# Patient Record
Sex: FEMALE | Race: OTHER | Employment: PART TIME | URBAN - METROPOLITAN AREA
[De-identification: names, ages, dates, MRNs, and addresses within clinical notes are randomized per-mention and may not be internally consistent; named-entity substitution may affect disease eponyms.]

---

## 2017-10-24 ENCOUNTER — LAB ENCOUNTER (OUTPATIENT)
Dept: LAB | Age: 22
End: 2017-10-24
Attending: FAMILY MEDICINE
Payer: COMMERCIAL

## 2017-10-24 DIAGNOSIS — L68.0 HIRSUTISM: ICD-10-CM

## 2017-10-24 DIAGNOSIS — Z00.00 PHYSICAL EXAM, ROUTINE: ICD-10-CM

## 2017-10-24 PROCEDURE — 85025 COMPLETE CBC W/AUTO DIFF WBC: CPT

## 2017-10-24 PROCEDURE — 80053 COMPREHEN METABOLIC PANEL: CPT

## 2017-10-24 PROCEDURE — 84403 ASSAY OF TOTAL TESTOSTERONE: CPT

## 2017-10-24 PROCEDURE — 36415 COLL VENOUS BLD VENIPUNCTURE: CPT

## 2017-10-24 PROCEDURE — 84443 ASSAY THYROID STIM HORMONE: CPT

## 2017-10-24 PROCEDURE — 84146 ASSAY OF PROLACTIN: CPT

## 2017-10-24 PROCEDURE — 83498 ASY HYDROXYPROGESTERONE 17-D: CPT

## 2017-10-24 NOTE — PROGRESS NOTES
HPI:    Patient ID: Megan Coulter is a 23year old female. Has anxiety. Working full time. Review of Systems   Constitutional: Negative. Negative for activity change, appetite change, chills and diaphoresis. HENT: Negative.     Respiratory: Neg tenderness or deformity. Lymphadenopathy:        Right: No inguinal adenopathy present. Left: No inguinal adenopathy present. Neurological: She is alert and oriented to person, place, and time. She has normal reflexes.    Skin: Skin is warm and d

## 2017-10-26 ENCOUNTER — TELEPHONE (OUTPATIENT)
Dept: OTHER | Age: 22
End: 2017-10-26

## 2018-11-03 ENCOUNTER — HOSPITAL ENCOUNTER (OUTPATIENT)
Dept: MAMMOGRAPHY | Age: 23
Discharge: HOME | End: 2018-11-03
Attending: SPECIALIST
Payer: COMMERCIAL

## 2018-11-03 DIAGNOSIS — N64.4 PAIN OF RIGHT BREAST: ICD-10-CM

## 2019-11-27 ENCOUNTER — OFFICE VISIT (OUTPATIENT)
Dept: FAMILY MEDICINE CLINIC | Facility: CLINIC | Age: 24
End: 2019-11-27
Payer: COMMERCIAL

## 2019-11-27 VITALS
TEMPERATURE: 98 F | WEIGHT: 147 LBS | BODY MASS INDEX: 24.49 KG/M2 | DIASTOLIC BLOOD PRESSURE: 51 MMHG | SYSTOLIC BLOOD PRESSURE: 86 MMHG | HEART RATE: 59 BPM | HEIGHT: 65 IN

## 2019-11-27 DIAGNOSIS — Z02.0 SCHOOL PHYSICAL EXAM: Primary | ICD-10-CM

## 2019-11-27 PROCEDURE — 99395 PREV VISIT EST AGE 18-39: CPT | Performed by: FAMILY MEDICINE

## 2019-11-27 PROCEDURE — 90715 TDAP VACCINE 7 YRS/> IM: CPT | Performed by: FAMILY MEDICINE

## 2019-11-27 PROCEDURE — 90471 IMMUNIZATION ADMIN: CPT | Performed by: FAMILY MEDICINE

## 2019-11-27 NOTE — PROGRESS NOTES
HPI:    Patient ID: Faisal Mayo is a 30year old female. Here for school physical. No complaints . Doing well      Review of Systems   Constitutional: Negative. Negative for activity change, diaphoresis and fever. HENT: Negative.     Respiratory: Neg

## 2020-03-19 ENCOUNTER — TELEPHONE (OUTPATIENT)
Dept: FAMILY MEDICINE CLINIC | Facility: CLINIC | Age: 25
End: 2020-03-19

## 2020-03-19 DIAGNOSIS — B34.9 VIRAL SYNDROME: Primary | ICD-10-CM

## 2020-03-19 RX ORDER — BENZONATATE 200 MG/1
200 CAPSULE ORAL 3 TIMES DAILY PRN
Qty: 30 CAPSULE | Refills: 0 | Status: SHIPPED | OUTPATIENT
Start: 2020-03-19 | End: 2020-06-30 | Stop reason: ALTCHOICE

## 2020-03-20 ENCOUNTER — APPOINTMENT (OUTPATIENT)
Dept: LAB | Facility: HOSPITAL | Age: 25
End: 2020-03-20
Attending: FAMILY MEDICINE
Payer: COMMERCIAL

## 2020-03-20 DIAGNOSIS — B34.9 VIRAL SYNDROME: ICD-10-CM

## 2020-03-20 PROCEDURE — 87798 DETECT AGENT NOS DNA AMP: CPT

## 2020-03-20 PROCEDURE — 87633 RESP VIRUS 12-25 TARGETS: CPT

## 2020-03-20 PROCEDURE — 87581 M.PNEUMON DNA AMP PROBE: CPT

## 2020-03-20 PROCEDURE — 87486 CHLMYD PNEUM DNA AMP PROBE: CPT

## 2020-03-20 NOTE — TELEPHONE ENCOUNTER
I ordered covid testing .  Patient was contacted
Left message that I will call in cough drops.  If not better needs to call back
PT called in and states she has a dry cough and would like medication for it.  Please advise
Patient states she awaiting call from Dr. Marry Ingram as she is worried.
The patient called back and stated was to called back . She would like to be tested for the Covid 19    She denies a fever, chest congestion/pain     She has a sore throat, tickle behind her throat, headache, fatigue, body aches, runny nose,     She did travel to [facility] 1 month ago and was around sick children.     She received a letter stating that where she work and they work very closely together that a personal had a positive Covid 19 test.
Virtual/Telephone Check-In    [employer] verbally  a Virtual/Telephone Check-In service on 03/19/20. Patient understands and accepts financial responsibility for any deductible, co-insurance and/or co-pays associated with this service.     Duration of the service: ***    Summary of topics discussed:  ***
No

## 2020-03-21 LAB

## 2020-03-24 ENCOUNTER — TELEPHONE (OUTPATIENT)
Dept: FAMILY MEDICINE CLINIC | Facility: CLINIC | Age: 25
End: 2020-03-24

## 2020-03-24 NOTE — TELEPHONE ENCOUNTER
Spoke with patient and informed of positive rhinovirus result. Supportive care discussed. Follow up with pcp as needed. Patient verbalized understanding.

## 2020-06-30 ENCOUNTER — HOSPITAL ENCOUNTER (OUTPATIENT)
Dept: GENERAL RADIOLOGY | Facility: HOSPITAL | Age: 25
Discharge: HOME | End: 2020-06-30
Attending: FAMILY MEDICINE
Payer: MEDICAID

## 2020-06-30 ENCOUNTER — TELEPHONE (OUTPATIENT)
Dept: FAMILY MEDICINE CLINIC | Facility: CLINIC | Age: 25
End: 2020-06-30

## 2020-06-30 ENCOUNTER — OFFICE VISIT (OUTPATIENT)
Dept: FAMILY MEDICINE CLINIC | Facility: CLINIC | Age: 25
End: 2020-06-30
Payer: MEDICAID

## 2020-06-30 VITALS
BODY MASS INDEX: 23.46 KG/M2 | TEMPERATURE: 100 F | WEIGHT: 140.81 LBS | HEIGHT: 65 IN | HEART RATE: 78 BPM | DIASTOLIC BLOOD PRESSURE: 76 MMHG | SYSTOLIC BLOOD PRESSURE: 118 MMHG

## 2020-06-30 DIAGNOSIS — M79.604 PAIN OF RIGHT LOWER EXTREMITY: ICD-10-CM

## 2020-06-30 DIAGNOSIS — H53.9 VISION DISORDER: ICD-10-CM

## 2020-06-30 DIAGNOSIS — M25.561 RIGHT KNEE PAIN, UNSPECIFIED CHRONICITY: ICD-10-CM

## 2020-06-30 DIAGNOSIS — M79.671 FOOT PAIN, BILATERAL: ICD-10-CM

## 2020-06-30 DIAGNOSIS — M79.604 LOWER EXTREMITY PAIN, RIGHT: Primary | ICD-10-CM

## 2020-06-30 DIAGNOSIS — M79.672 FOOT PAIN, BILATERAL: ICD-10-CM

## 2020-06-30 DIAGNOSIS — M25.561 RIGHT KNEE PAIN, UNSPECIFIED CHRONICITY: Primary | ICD-10-CM

## 2020-06-30 PROCEDURE — 99213 OFFICE O/P EST LOW 20 MIN: CPT | Performed by: FAMILY MEDICINE

## 2020-06-30 PROCEDURE — 73590 X-RAY EXAM OF LOWER LEG: CPT | Performed by: FAMILY MEDICINE

## 2020-06-30 PROCEDURE — 73564 X-RAY EXAM KNEE 4 OR MORE: CPT | Performed by: FAMILY MEDICINE

## 2020-06-30 RX ORDER — IBUPROFEN 600 MG/1
600 TABLET ORAL EVERY 6 HOURS PRN
COMMUNITY
End: 2021-03-31

## 2020-06-30 NOTE — TELEPHONE ENCOUNTER
Scheduled to see Dr Vivian Real 6/30/20 2:15 pm OV. Advised to wear a mask, at-door screening. TS done. Patient stated right knee has flared, possibly due to exercise, it is swollen, no redness and no increased warmth to the right knee today.  The right knee

## 2020-06-30 NOTE — TELEPHONE ENCOUNTER
Dr. Won Alcala need new order patient trying to schedule need knee xray should be right not left for right lower extremity pain.    Pended for your approval

## 2020-07-01 NOTE — PROGRESS NOTES
HPI:    Patient ID: Ai Smith is a 30year old female. Has right knee pain and mild swelling . Doing boot camp. Also history of some ?fractire tibia? Review of Systems   Constitutional: Negative. Respiratory: Negative.     Cardiovascular: Nega

## 2020-07-11 ENCOUNTER — OFFICE VISIT (OUTPATIENT)
Dept: FAMILY MEDICINE CLINIC | Facility: CLINIC | Age: 25
End: 2020-07-11
Payer: MEDICAID

## 2020-07-11 VITALS
BODY MASS INDEX: 23.32 KG/M2 | DIASTOLIC BLOOD PRESSURE: 72 MMHG | HEIGHT: 65 IN | HEART RATE: 65 BPM | WEIGHT: 140 LBS | SYSTOLIC BLOOD PRESSURE: 108 MMHG | TEMPERATURE: 99 F

## 2020-07-11 DIAGNOSIS — M25.561 ARTHRALGIA OF RIGHT LOWER LEG: ICD-10-CM

## 2020-07-11 DIAGNOSIS — H91.93 HEARING DIFFICULTY OF BOTH EARS: Primary | ICD-10-CM

## 2020-07-11 PROCEDURE — 3074F SYST BP LT 130 MM HG: CPT | Performed by: FAMILY MEDICINE

## 2020-07-11 PROCEDURE — 3078F DIAST BP <80 MM HG: CPT | Performed by: FAMILY MEDICINE

## 2020-07-11 PROCEDURE — 3008F BODY MASS INDEX DOCD: CPT | Performed by: FAMILY MEDICINE

## 2020-07-11 PROCEDURE — 99214 OFFICE O/P EST MOD 30 MIN: CPT | Performed by: FAMILY MEDICINE

## 2020-07-11 RX ORDER — CEFADROXIL 500 MG/1
500 CAPSULE ORAL 2 TIMES DAILY
Qty: 14 CAPSULE | Refills: 0 | Status: SHIPPED | OUTPATIENT
Start: 2020-07-11 | End: 2020-07-22 | Stop reason: ALTCHOICE

## 2020-07-17 ENCOUNTER — OFFICE VISIT (OUTPATIENT)
Dept: OTOLARYNGOLOGY | Facility: CLINIC | Age: 25
End: 2020-07-17
Payer: MEDICAID

## 2020-07-17 ENCOUNTER — OFFICE VISIT (OUTPATIENT)
Dept: AUDIOLOGY | Facility: CLINIC | Age: 25
End: 2020-07-17

## 2020-07-17 VITALS — HEIGHT: 65 IN | WEIGHT: 140 LBS | BODY MASS INDEX: 23.32 KG/M2

## 2020-07-17 DIAGNOSIS — H91.93 BILATERAL HEARING LOSS, UNSPECIFIED HEARING LOSS TYPE: Primary | ICD-10-CM

## 2020-07-17 DIAGNOSIS — H91.8X9 OTHER SPECIFIED HEARING LOSS, UNSPECIFIED EAR: Primary | ICD-10-CM

## 2020-07-17 PROCEDURE — 3008F BODY MASS INDEX DOCD: CPT | Performed by: OTOLARYNGOLOGY

## 2020-07-17 PROCEDURE — 92557 COMPREHENSIVE HEARING TEST: CPT | Performed by: AUDIOLOGIST

## 2020-07-17 PROCEDURE — 99203 OFFICE O/P NEW LOW 30 MIN: CPT | Performed by: OTOLARYNGOLOGY

## 2020-07-17 PROCEDURE — 92567 TYMPANOMETRY: CPT | Performed by: AUDIOLOGIST

## 2020-07-17 NOTE — PROGRESS NOTES
Giuliana Mosley is a 30year old female. Patient presents with:  Ear Problem: pt presents with Hearing loss, ringing in both ears         HISTORY OF PRESENT ILLNESS  7/17/2020   Here for evaluation of  Bilateral  hearing loss.  Patient feels that she had this on file        Physically abused: Not on file        Forced sexual activity: Not on file    Other Topics      Concerns:         Service: Not Asked        Blood Transfusions: Not Asked        Caffeine Concern: Yes          Coffee, 3 cups daily kg/m²     System Findings Details   Skin Normal Inspection - Normal. No suspicious lesions bruises or masses.    Constitutional Normal Overall appearance - Normal.   Head/Face Normal Facial features - Normal. Eyebrows - Normal. Skull - Normal.   Nasopharynx

## 2020-07-21 NOTE — PROGRESS NOTES
AUDIOLOGY REPORT      Fabiana Ohara is a 30year old female     Referring Provider: Bobby Galindo   YOB: 1995  Medical Record: [id number]      Patient Hearing History:  Patient reported a question of hearing loss.        Otoscopic Inspection:

## 2020-07-22 ENCOUNTER — OFFICE VISIT (OUTPATIENT)
Dept: FAMILY MEDICINE CLINIC | Facility: CLINIC | Age: 25
End: 2020-07-22
Payer: MEDICAID

## 2020-07-22 ENCOUNTER — MED REC SCAN ONLY (OUTPATIENT)
Dept: FAMILY MEDICINE CLINIC | Facility: CLINIC | Age: 25
End: 2020-07-22

## 2020-07-22 VITALS
HEIGHT: 65 IN | DIASTOLIC BLOOD PRESSURE: 56 MMHG | SYSTOLIC BLOOD PRESSURE: 91 MMHG | HEART RATE: 48 BPM | BODY MASS INDEX: 23.82 KG/M2 | WEIGHT: 143 LBS | TEMPERATURE: 99 F

## 2020-07-22 DIAGNOSIS — M25.561 CHRONIC PAIN OF RIGHT KNEE: Primary | ICD-10-CM

## 2020-07-22 DIAGNOSIS — G89.29 CHRONIC PAIN OF RIGHT KNEE: Primary | ICD-10-CM

## 2020-07-22 PROCEDURE — 3074F SYST BP LT 130 MM HG: CPT | Performed by: FAMILY MEDICINE

## 2020-07-22 PROCEDURE — 99213 OFFICE O/P EST LOW 20 MIN: CPT | Performed by: FAMILY MEDICINE

## 2020-07-22 PROCEDURE — 3008F BODY MASS INDEX DOCD: CPT | Performed by: FAMILY MEDICINE

## 2020-07-22 PROCEDURE — 97810 ACUP 1/> WO ESTIM 1ST 15 MIN: CPT | Performed by: FAMILY MEDICINE

## 2020-07-22 PROCEDURE — 3078F DIAST BP <80 MM HG: CPT | Performed by: FAMILY MEDICINE

## 2020-07-22 NOTE — PROGRESS NOTES
HPI:    Patient ID: Triny Farrell is a 30year old female. Patient complaining about the pain in the inner aspect of the lnee the same spot where she had pain where had subchondral fracture opf the tibia.  Patient is a runner and this has been limiting he

## 2020-07-22 NOTE — PROCEDURES
Patient tolerated procedure well in excess of 30 minutes was spent with patient   There was no complications all needles were removed.    Patient was advised to rest today and f/u in 1-2 weeks  tim treatment and ear points  Local points with high frequency

## 2020-07-29 ENCOUNTER — OFFICE VISIT (OUTPATIENT)
Dept: NEUROLOGY | Facility: CLINIC | Age: 25
End: 2020-07-29
Payer: MEDICAID

## 2020-07-29 DIAGNOSIS — S86.891A RIGHT MEDIAL TIBIAL STRESS SYNDROME, INITIAL ENCOUNTER: ICD-10-CM

## 2020-07-29 DIAGNOSIS — N92.6 IRREGULAR MENSTRUAL CYCLE: ICD-10-CM

## 2020-07-29 DIAGNOSIS — M25.561 CHRONIC PAIN OF RIGHT KNEE: Primary | ICD-10-CM

## 2020-07-29 DIAGNOSIS — G89.29 CHRONIC PAIN OF RIGHT KNEE: Primary | ICD-10-CM

## 2020-07-29 DIAGNOSIS — F41.1 ANXIETY STATE: ICD-10-CM

## 2020-07-29 PROCEDURE — 99245 OFF/OP CONSLTJ NEW/EST HI 55: CPT | Performed by: PHYSICAL MEDICINE & REHABILITATION

## 2020-07-29 NOTE — PROGRESS NOTES
[address]  Progress Note    CHIEF COMPLAINT:  Patient presents with:  Knee Pain: New pt presents for Right knee pain, previous tibial hairline fx.  Pt is a  as a side job Right inside of knee, tender • Diabetes Other         History of Diabetes   • Hypertension Other         History of Hypertension   • Glaucoma Neg         No glaucoma history       CURRENT MEDICATIONS:   Current Outpatient Medications   Medication Sig Dispense Refill   • triamcinolon extremity edema bilaterally, tender to palpation over the medial shin over the distal third  Skin: No lesions noted. Hips: full and painfree ROM   Knees: Tender over the medial joint line on the right. Good varus valgus stability.   Lachman's is negative Veto Gutierres

## 2020-07-30 NOTE — PROGRESS NOTES
HPI:    Patient ID: Faisal Mayo is a 30year old female. Has folliculitis in the groin area   Also complaining about bilateral hearing loss  Also has ankle pain       Review of Systems  Constitutional: Negative.   Negative for activity change, appetite

## 2020-07-31 ENCOUNTER — OFFICE VISIT (OUTPATIENT)
Dept: ORTHOPEDICS CLINIC | Facility: CLINIC | Age: 25
End: 2020-07-31
Payer: MEDICAID

## 2020-07-31 VITALS — HEIGHT: 65 IN | WEIGHT: 143 LBS | BODY MASS INDEX: 23.82 KG/M2

## 2020-07-31 DIAGNOSIS — M23.91 INTERNAL DERANGEMENT OF RIGHT KNEE: Primary | ICD-10-CM

## 2020-07-31 PROCEDURE — 3008F BODY MASS INDEX DOCD: CPT | Performed by: ORTHOPAEDIC SURGERY

## 2020-07-31 PROCEDURE — 99203 OFFICE O/P NEW LOW 30 MIN: CPT | Performed by: ORTHOPAEDIC SURGERY

## 2020-07-31 RX ORDER — TERBINAFINE HYDROCHLORIDE 250 MG/1
250 TABLET ORAL DAILY
COMMUNITY

## 2020-07-31 RX ORDER — MELOXICAM 15 MG/1
15 TABLET ORAL DAILY
Qty: 30 TABLET | Refills: 0 | Status: SHIPPED | OUTPATIENT
Start: 2020-07-31

## 2020-07-31 NOTE — PROGRESS NOTES
NURSING INTAKE COMMENTS: Patient presents with:  Knee Pain: Right - onset several years ago with a fx tibia - this year got worse - has swelling , throbbing pain , knee locks up  - has x-rays in the system - rates pain as 5-9/10 at all the time depending o Cannabis      Sexual activity: Yes        Partners: Male       Review of Systems:  GENERAL: feels generally well, no significant weight loss or weight gain  SKIN: no ulcerated or worrisome skin lesions  EYES:denies blurred vision or double vision  HEENT: d anserine bursitis and a tear of the medial meniscus. A stress fracture of the medial tibial plateau is also on the differential diagnosis. Plan: She is scheduled to follow-up with physiatry after her MRI has been completed.   I gave her prescription for

## 2020-08-04 ENCOUNTER — OFFICE VISIT (OUTPATIENT)
Dept: OBGYN CLINIC | Facility: CLINIC | Age: 25
End: 2020-08-04
Payer: MEDICAID

## 2020-08-04 ENCOUNTER — TELEMEDICINE (OUTPATIENT)
Dept: FAMILY MEDICINE CLINIC | Facility: CLINIC | Age: 25
End: 2020-08-04

## 2020-08-04 ENCOUNTER — LAB ENCOUNTER (OUTPATIENT)
Dept: LAB | Facility: HOSPITAL | Age: 25
End: 2020-08-04
Attending: OBSTETRICS & GYNECOLOGY
Payer: MEDICAID

## 2020-08-04 VITALS
WEIGHT: 143 LBS | BODY MASS INDEX: 23.82 KG/M2 | SYSTOLIC BLOOD PRESSURE: 112 MMHG | HEIGHT: 65 IN | DIASTOLIC BLOOD PRESSURE: 62 MMHG

## 2020-08-04 DIAGNOSIS — Z11.3 SCREEN FOR STD (SEXUALLY TRANSMITTED DISEASE): ICD-10-CM

## 2020-08-04 DIAGNOSIS — Z01.419 WELL WOMAN EXAM WITH ROUTINE GYNECOLOGICAL EXAM: Primary | ICD-10-CM

## 2020-08-04 DIAGNOSIS — R10.9 ABDOMINAL PAIN, UNSPECIFIED ABDOMINAL LOCATION: Primary | ICD-10-CM

## 2020-08-04 LAB
HBV SURFACE AG SER-ACNC: <0.1 [IU]/L
HBV SURFACE AG SERPL QL IA: NONREACTIVE
HCV AB SERPL QL IA: NONREACTIVE

## 2020-08-04 PROCEDURE — 87389 HIV-1 AG W/HIV-1&-2 AB AG IA: CPT

## 2020-08-04 PROCEDURE — 87340 HEPATITIS B SURFACE AG IA: CPT

## 2020-08-04 PROCEDURE — 3078F DIAST BP <80 MM HG: CPT | Performed by: OBSTETRICS & GYNECOLOGY

## 2020-08-04 PROCEDURE — 36415 COLL VENOUS BLD VENIPUNCTURE: CPT

## 2020-08-04 PROCEDURE — 3008F BODY MASS INDEX DOCD: CPT | Performed by: OBSTETRICS & GYNECOLOGY

## 2020-08-04 PROCEDURE — 86803 HEPATITIS C AB TEST: CPT

## 2020-08-04 PROCEDURE — 86780 TREPONEMA PALLIDUM: CPT

## 2020-08-04 PROCEDURE — 99385 PREV VISIT NEW AGE 18-39: CPT | Performed by: OBSTETRICS & GYNECOLOGY

## 2020-08-04 PROCEDURE — 99213 OFFICE O/P EST LOW 20 MIN: CPT | Performed by: FAMILY MEDICINE

## 2020-08-04 PROCEDURE — 3074F SYST BP LT 130 MM HG: CPT | Performed by: OBSTETRICS & GYNECOLOGY

## 2020-08-04 PROCEDURE — 99212 OFFICE O/P EST SF 10 MIN: CPT | Performed by: OBSTETRICS & GYNECOLOGY

## 2020-08-04 NOTE — PROGRESS NOTES
HPI:    Patient ID: Warner Bang is a 30year old year old female. HPI  New patient  Well woman visit with a number of GYN problems  Complains of spotting since Cornelious Chute IUD placed in November. .  Is very light.   States that menses were irregular prior to Physical Exam    Vitals: /62   Ht 5' 5\" (1.651 m)   Wt 143 lb (64.9 kg)   LMP 07/15/2020 (Exact Date)   BMI 23.80 kg/m²   Neck: Supple and without masses. No cervical adenopathy. Breasts: Symmetric. Skin without lesions. No masses.   Miroslava Schwarz disease)  Plan: requested and ordered. IUD check up. Scant periods due to [facility].     10 min additional E/M time face to face  Orders Placed This Encounter      Hpv Dna  High Risk , Thin Prep Collect          Standing Status: Future          Standing E

## 2020-08-05 ENCOUNTER — TELEPHONE (OUTPATIENT)
Dept: OBGYN CLINIC | Facility: CLINIC | Age: 25
End: 2020-08-05

## 2020-08-05 DIAGNOSIS — L81.9 PIGMENTATION ABNORMALITY OF SKIN: Primary | ICD-10-CM

## 2020-08-05 LAB
C TRACH DNA SPEC QL NAA+PROBE: NEGATIVE
HPV I/H RISK 1 DNA SPEC QL NAA+PROBE: NEGATIVE
N GONORRHOEA DNA SPEC QL NAA+PROBE: NEGATIVE
T PALLIDUM AB SER QL: NEGATIVE

## 2020-08-05 NOTE — TELEPHONE ENCOUNTER
----- Message from Aimir Smith sent at 8/4/2020  5:58 PM CDT -----  Regarding: Referral Request  Contact: [phone]  Armani,     Thank you for your time today.      I did not receive the referral for the dermatologist.     May you please send me the refe

## 2020-08-05 NOTE — TELEPHONE ENCOUNTER
I wrote on her sheet yesterday the name of dermatologist Dr. Diego Zamora. Patient can be given her phone number.

## 2020-08-05 NOTE — TELEPHONE ENCOUNTER
Alexia message sent to pt.  ----- Message from Jennie Perez MD sent at 8/5/2020  1:09 PM CDT -----  Please inform by whatever means that her blood screening for sexually transmitted infections including HIV, hepatitis B and C, and syphilis are negativ

## 2020-08-06 ENCOUNTER — TELEPHONE (OUTPATIENT)
Dept: NEUROLOGY | Facility: CLINIC | Age: 25
End: 2020-08-06

## 2020-08-06 ENCOUNTER — TELEPHONE (OUTPATIENT)
Dept: OBGYN CLINIC | Facility: CLINIC | Age: 25
End: 2020-08-06

## 2020-08-06 NOTE — PROGRESS NOTES
HPI:    Patient ID: Cherise Chopra is a 30year old female.     Telehealth outside of [facility] [address] Verbal Consent   I conducted a telehealth visit with Cherise Chopra today, 08/05/20, which was completed using two-way, real-time interactive audio and vid  Negative for activity change, appetite change, diaphoresis and fatigue. Respiratory: Negative.  Negative for apnea, cough, chest tightness and shortness of breath.    Cardiovascular: Negative.  Negative for chest pain, palpitations and leg swelling.

## 2020-08-06 NOTE — TELEPHONE ENCOUNTER
[employer] message sent to pt.      ----- Message from Lorna Nair MD sent at 8/6/2020  3:14 PM CDT -----  Please notify by MyChart or letter of normal Pap test and normal HPV cotesting.

## 2020-08-06 NOTE — TELEPHONE ENCOUNTER
MRI KNEE, RIGHT (CPT=73721)-pending approval    *Referral above did not populate in the [facility] Referral Queue.      Michael Gill for authorization of approval for MRI KNEE, RIGHT (CYY=13345) Spoke to Evita Olvera who states authorization is requi

## 2020-08-07 ENCOUNTER — MED REC SCAN ONLY (OUTPATIENT)
Dept: NEUROLOGY | Facility: CLINIC | Age: 25
End: 2020-08-07

## 2020-08-08 ENCOUNTER — TELEPHONE (OUTPATIENT)
Dept: OBGYN CLINIC | Facility: CLINIC | Age: 25
End: 2020-08-08

## 2020-08-08 NOTE — TELEPHONE ENCOUNTER
----- Message from Renée Fraser sent at 8/7/2020  5:27 PM CDT -----  Regarding: RE: Referral Request  Contact: [phone]  I need a physical copy!     ----- Message -----  From: Quan Martinez  Sent: 8/5/20, 1:49 PM  To: Renée Fraser  Subject: RE: Referral R

## 2020-08-10 NOTE — TELEPHONE ENCOUNTER
Received fax from [address] [place] [address] of approval for MRI right knee. . Authorization # [id number] effective 08/10/20 to 02/06/21. Will call Pt. to inform. Pt. informed of approval. Transferred call to scheduling for appt.

## 2020-08-16 ENCOUNTER — HOSPITAL ENCOUNTER (OUTPATIENT)
Dept: MRI IMAGING | Age: 25
Discharge: HOME | End: 2020-08-16
Attending: PHYSICAL MEDICINE & REHABILITATION
Payer: MEDICAID

## 2020-08-16 DIAGNOSIS — G89.29 CHRONIC PAIN OF RIGHT KNEE: ICD-10-CM

## 2020-08-16 DIAGNOSIS — M25.561 CHRONIC PAIN OF RIGHT KNEE: ICD-10-CM

## 2020-08-16 PROCEDURE — 73721 MRI JNT OF LWR EXTRE W/O DYE: CPT | Performed by: PHYSICAL MEDICINE & REHABILITATION

## 2020-08-17 ENCOUNTER — TELEPHONE (OUTPATIENT)
Dept: NEUROLOGY | Facility: CLINIC | Age: 25
End: 2020-08-17

## 2020-08-17 DIAGNOSIS — M22.2X1 PATELLOFEMORAL PAIN SYNDROME OF RIGHT KNEE: Primary | ICD-10-CM

## 2020-08-17 DIAGNOSIS — S86.891A RIGHT MEDIAL TIBIAL STRESS SYNDROME, INITIAL ENCOUNTER: ICD-10-CM

## 2020-08-17 NOTE — TELEPHONE ENCOUNTER
----- Message from Jo Ann Pugh MD sent at 8/17/2020  3:50 PM CDT -----  Her knee MRI shows intact menisci and no evidence for fracture. There is a medial plica. Please let the patient know that her knee MRI looks good.   No meniscus injury, no fra

## 2020-08-18 ENCOUNTER — TELEPHONE (OUTPATIENT)
Dept: FAMILY MEDICINE CLINIC | Facility: CLINIC | Age: 25
End: 2020-08-18

## 2020-08-18 NOTE — TELEPHONE ENCOUNTER
Patient states she lost her insurance card, has an appointment for a test today. patient is requesting a copy of her insurance card uploaded to her [employer].

## 2020-08-26 ENCOUNTER — OFFICE VISIT (OUTPATIENT)
Dept: PHYSICAL THERAPY | Age: 25
End: 2020-08-26
Attending: PHYSICAL MEDICINE & REHABILITATION
Payer: MEDICAID

## 2020-08-26 DIAGNOSIS — M22.2X1 PATELLOFEMORAL PAIN SYNDROME OF RIGHT KNEE: ICD-10-CM

## 2020-08-26 DIAGNOSIS — S86.891A RIGHT MEDIAL TIBIAL STRESS SYNDROME, INITIAL ENCOUNTER: ICD-10-CM

## 2020-08-26 PROCEDURE — 97161 PT EVAL LOW COMPLEX 20 MIN: CPT

## 2020-08-26 PROCEDURE — 97110 THERAPEUTIC EXERCISES: CPT

## 2020-08-26 NOTE — PROGRESS NOTES
LOWER EXTREMITY EVALUATION:   Referring Physician: Dr. Oliver Clay  Dx:   Patellofemoral pain syndrome of right knee (M22.2X1)  Right medial tibial stress syndrome, initial encounter (P43.555P) Date of Service: 8/26/2020     PATIENT SUMMARY   Ellie Mcclain is a limitations include: running, prolonged walking >1mile, squatting, lunging    Yany describes prior level of function: prior to May, she was able to run and exercise with some pain, but was able to push through     Pt goals include: strengthen her ankle and B  Sensation: intact to light touch    AROM:  Knee: WNL B all motions  Ankle: WNL with exception of decreased L ankle DF  Hip: WNL B all motions      Flexibility:   Hip Flexor: WNL  Hamstrings: WNL  Piriformis: Minimal restriction  Quads:  WNL  Gastroc: WNL min     Based on clinical rationale and outcome measures, this evaluation involved Low Complexity decision making. PLAN OF CARE:    Goals:    1.  Pt will verbalize and demo compliance with HEP at least 75% of the time to allow for independent manag

## 2020-09-02 ENCOUNTER — OFFICE VISIT (OUTPATIENT)
Dept: PHYSICAL THERAPY | Age: 25
End: 2020-09-02
Attending: PHYSICAL MEDICINE & REHABILITATION
Payer: MEDICAID

## 2020-09-02 PROCEDURE — 97110 THERAPEUTIC EXERCISES: CPT

## 2020-09-02 PROCEDURE — 97530 THERAPEUTIC ACTIVITIES: CPT

## 2020-09-02 NOTE — PROGRESS NOTES
Diagnosis: Patellofemoral pain syndrome of right knee (M22.2X1)  Right medial tibial stress syndrome, initial encounter (M72.486M)  Insurance (Authorized # of Visits):  Jalen Alonso (6 visits exp 2/24/21)      Authorizing Physician: Dr. Ketan Boyer MD visit: none range 5-6 oscillations rep 3-5x alternating sides    Sidestepping with BTB 1 lap then stopped secondary to lumbar compensation    Standing glut med hip abd with BTB 2x10    Wall squat with BlackTB around knees with hip abd 2x10 R/L    Self roll out with hy

## 2020-09-08 ENCOUNTER — LAB ENCOUNTER (OUTPATIENT)
Dept: LAB | Facility: HOSPITAL | Age: 25
End: 2020-09-08
Payer: MEDICAID

## 2020-09-08 DIAGNOSIS — B35.1 TINEA UNGUIUM: Primary | ICD-10-CM

## 2020-09-08 LAB
ALBUMIN SERPL-MCNC: 4.2 G/DL (ref 3.4–5)
ALP LIVER SERPL-CCNC: 92 U/L (ref 37–98)
ALT SERPL-CCNC: 27 U/L (ref 13–56)
AST SERPL-CCNC: 25 U/L (ref 15–37)
BILIRUB DIRECT SERPL-MCNC: 0.1 MG/DL (ref 0–0.2)
BILIRUB SERPL-MCNC: 0.3 MG/DL (ref 0.1–2)
M PROTEIN MFR SERPL ELPH: 7.4 G/DL (ref 6.4–8.2)

## 2020-09-08 PROCEDURE — 80076 HEPATIC FUNCTION PANEL: CPT

## 2020-09-08 PROCEDURE — 36415 COLL VENOUS BLD VENIPUNCTURE: CPT

## 2020-09-09 ENCOUNTER — APPOINTMENT (OUTPATIENT)
Dept: PHYSICAL THERAPY | Age: 25
End: 2020-09-09
Attending: PHYSICAL MEDICINE & REHABILITATION
Payer: MEDICAID

## 2020-09-16 ENCOUNTER — APPOINTMENT (OUTPATIENT)
Dept: PHYSICAL THERAPY | Age: 25
End: 2020-09-16
Attending: PHYSICAL MEDICINE & REHABILITATION
Payer: MEDICAID

## 2020-09-18 ENCOUNTER — OFFICE VISIT (OUTPATIENT)
Dept: PHYSICAL THERAPY | Age: 25
End: 2020-09-18
Attending: PHYSICAL MEDICINE & REHABILITATION
Payer: MEDICAID

## 2020-09-18 PROCEDURE — 97112 NEUROMUSCULAR REEDUCATION: CPT

## 2020-09-18 PROCEDURE — 97110 THERAPEUTIC EXERCISES: CPT

## 2020-09-18 NOTE — PROGRESS NOTES
Diagnosis: Patellofemoral pain syndrome of right knee (M22.2X1)  Right medial tibial stress syndrome, initial encounter (S07.732P)  Insurance (Authorized # of Visits):  Jalen Alonso (6 visits exp 2/24/21)      Authorizing Physician: Dr. Lonnie Baires  Next MD visit: none roller at lumbar paraspinals, HS, ITB, adductors on R SLS clocks lat and fwd 2x10 R/L with mirror cue    Squatting with weight shift laterally    Squatting with weight shift laterally and light UE support for improved sitback    Squatting with BlackTB arou progress  5. Pt will be able to ambulate 1mile or more without pain - in progress    Charges:  TherEx x3, Neuro Re-ed x1    Total Timed Treatment: 55 min  Total Treatment Time: 55 min

## 2020-09-23 ENCOUNTER — TELEPHONE (OUTPATIENT)
Dept: PHYSICAL THERAPY | Age: 25
End: 2020-09-23

## 2020-09-25 ENCOUNTER — APPOINTMENT (OUTPATIENT)
Dept: PHYSICAL THERAPY | Age: 25
End: 2020-09-25
Attending: PHYSICAL MEDICINE & REHABILITATION
Payer: MEDICAID

## 2020-09-29 ENCOUNTER — OFFICE VISIT (OUTPATIENT)
Dept: PHYSICAL THERAPY | Age: 25
End: 2020-09-29
Attending: PHYSICAL MEDICINE & REHABILITATION
Payer: MEDICAID

## 2020-09-29 PROCEDURE — 97110 THERAPEUTIC EXERCISES: CPT

## 2020-09-29 NOTE — PROGRESS NOTES
Diagnosis: Patellofemoral pain syndrome of right knee (M22.2X1)  Right medial tibial stress syndrome, initial encounter (L86.440Q)  Insurance (Authorized # of Visits):  Jalen Alonso (6 visits exp 21)      Authorizing Physician: Dr. Thelbert Apgar Next MD visit: none bridges with opp LE extended 3x10 R/L    Sidestepping BTB 3 laps [id number] ea dir    Deadbug 3x10    SB planks with roll out fwd then circles 2x10 ea    SB planks with feet on ball alt hip ext 2x10    SL dips off 8\" step 2x10 R/L with UE support as needed running - in progress  5. Pt will be able to ambulate 1mile or more without pain - in progress    Charges:  TherEx x3,  Total Timed Treatment: 45 min  Total Treatment Time: 45 min

## 2020-09-30 ENCOUNTER — OFFICE VISIT (OUTPATIENT)
Dept: DERMATOLOGY CLINIC | Facility: CLINIC | Age: 25
End: 2020-09-30
Payer: MEDICAID

## 2020-09-30 DIAGNOSIS — D23.5 BENIGN NEOPLASM OF SKIN OF TRUNK, EXCEPT SCROTUM: ICD-10-CM

## 2020-09-30 DIAGNOSIS — D23.60 BENIGN NEOPLASM OF SKIN OF UPPER LIMB, INCLUDING SHOULDER, UNSPECIFIED LATERALITY: ICD-10-CM

## 2020-09-30 DIAGNOSIS — L70.0 ACNE VULGARIS: ICD-10-CM

## 2020-09-30 DIAGNOSIS — D23.4 BENIGN NEOPLASM OF SCALP AND SKIN OF NECK: ICD-10-CM

## 2020-09-30 DIAGNOSIS — L81.9 DYSCHROMIA: ICD-10-CM

## 2020-09-30 DIAGNOSIS — D23.30 BENIGN NEOPLASM OF SKIN OF FACE: ICD-10-CM

## 2020-09-30 DIAGNOSIS — D23.70 BENIGN NEOPLASM OF SKIN OF LOWER LIMB, INCLUDING HIP, UNSPECIFIED LATERALITY: ICD-10-CM

## 2020-09-30 DIAGNOSIS — L68.0 HIRSUTISM: Primary | ICD-10-CM

## 2020-09-30 PROCEDURE — 99204 OFFICE O/P NEW MOD 45 MIN: CPT | Performed by: DERMATOLOGY

## 2020-09-30 RX ORDER — ADAPALENE 45 G/G
GEL TOPICAL
Qty: 45 G | Refills: 1 | Status: SHIPPED | OUTPATIENT
Start: 2020-09-30

## 2020-09-30 RX ORDER — HYDROQUINONE 40 MG/G
1 CREAM TOPICAL 2 TIMES DAILY
Qty: 30 G | Refills: 1 | Status: SHIPPED | OUTPATIENT
Start: 2020-09-30 | End: 2020-10-30

## 2020-09-30 RX ORDER — KETOCONAZOLE 20 MG/G
1 CREAM TOPICAL 2 TIMES DAILY
Qty: 30 G | Refills: 3 | Status: SHIPPED | OUTPATIENT
Start: 2020-09-30

## 2020-09-30 RX ORDER — CLOBETASOL PROPIONATE 0.5 MG/G
1 CREAM TOPICAL 2 TIMES DAILY
Qty: 60 G | Refills: 3 | Status: SHIPPED | OUTPATIENT
Start: 2020-09-30 | End: 2021-09-30

## 2020-10-09 ENCOUNTER — APPOINTMENT (OUTPATIENT)
Dept: PHYSICAL THERAPY | Age: 25
End: 2020-10-09
Attending: PHYSICAL MEDICINE & REHABILITATION
Payer: MEDICAID

## 2020-10-11 NOTE — PROGRESS NOTES
Ellie Mcclain is a 30year old female. HPI:     CC:  Patient presents with:  Derm Problem: New patient presents for full body check. Per patient, increased sun exposure this summer and seeing more pigment to face. No personal hx of skin ca.  Maternal aunt w by mouth daily. (Patient not taking: Reported on 8/4/2020 ) 30 tablet 0   • triamcinolone acetonide 0.1 % External Cream Apply topically 2 (two) times daily as needed.  (Patient not taking: Reported on 9/30/2020 ) 60 g 0     Allergies:   No Known Allergies Concerns:         Service: Not Asked        Blood Transfusions: Not Asked        Caffeine Concern: Yes          Coffee, 3 cups daily        Occupational Exposure: Not Asked        Hobby Hazards: Not Asked        Sleep Concern: Not Asked        Stre their usual state of health. History, medications, allergies reviewed as noted. ROS:  Denies any other systemic complaints. 10 point review of systems was completed. Pertinent positives and negatives noted in the the HPI.    No new or changeing lesio of skin of trunk, except scrotum  Benign neoplasm of skin of upper limb, including shoulder, unspecified laterality  Benign neoplasm of skin of lower limb, including hip, unspecified laterality      Acneiform lesions over the forehead fine papules, few nod generally fragrance free nonirritating reviewed. Patient with scattered dermatofibromas few scattered nevi cherry chilel reassurance no suspicious lesions noted. Please refer to map for specific lesions. See additional diagnoses.   Pros cons of various t

## 2020-10-16 ENCOUNTER — OFFICE VISIT (OUTPATIENT)
Dept: PHYSICAL THERAPY | Age: 25
End: 2020-10-16
Attending: PHYSICAL MEDICINE & REHABILITATION
Payer: MEDICAID

## 2020-10-16 PROCEDURE — 97110 THERAPEUTIC EXERCISES: CPT

## 2020-10-16 NOTE — PROGRESS NOTES
Diagnosis: Patellofemoral pain syndrome of right knee (M22.2X1)  Right medial tibial stress syndrome, initial encounter (V10.222C)  Insurance (Authorized # of Visits):  Jalen Alonso (6 visits exp 2/24/21)      Authorizing Physician: Dr. Arlette Singh  Next MD visit: none around knees with alt hip abd to fatigue repeated 5x with mirror cue    Supine SL bridge on heel 10x R/L with focus on glut activation    Long hanging at stall bar with gentle oscillations Supine SB bridge with tuck 3x20    SL bridges with opp LE extended activation, heel to toe and glut activation - handout declined  9/2: quadruped kickbacks 10#, standing glut med hip abd BTB - handout declined  9/18: SL slides fwd and lat, SL bird dips on stable or unstable surface, squatting with alt hip abd - handout is

## 2020-10-20 ENCOUNTER — LAB ENCOUNTER (OUTPATIENT)
Dept: LAB | Age: 25
End: 2020-10-20
Attending: DERMATOLOGY
Payer: MEDICAID

## 2020-10-20 DIAGNOSIS — L68.0 HIRSUTISM: ICD-10-CM

## 2020-10-20 PROCEDURE — 84403 ASSAY OF TOTAL TESTOSTERONE: CPT

## 2020-10-20 PROCEDURE — 84402 ASSAY OF FREE TESTOSTERONE: CPT

## 2020-10-20 PROCEDURE — 36415 COLL VENOUS BLD VENIPUNCTURE: CPT

## 2020-10-20 PROCEDURE — 82627 DEHYDROEPIANDROSTERONE: CPT

## 2020-11-03 ENCOUNTER — APPOINTMENT (OUTPATIENT)
Dept: PHYSICAL THERAPY | Age: 25
End: 2020-11-03
Attending: PHYSICAL MEDICINE & REHABILITATION
Payer: MEDICAID

## 2020-11-03 ENCOUNTER — TELEPHONE (OUTPATIENT)
Dept: PHYSICAL THERAPY | Age: 25
End: 2020-11-03

## 2021-01-01 ENCOUNTER — TELEPHONE (OUTPATIENT)
Dept: FAMILY MEDICINE CLINIC | Facility: CLINIC | Age: 26
End: 2021-01-01

## 2021-01-01 NOTE — TELEPHONE ENCOUNTER
Got page for sore throat and SOB. Phone call made, reports the same complaints, w/out fever, + minimal cough and \" dose not believe is COVID. \" Did not want to go to be tested.  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

## 2021-01-02 ENCOUNTER — TELEMEDICINE (OUTPATIENT)
Dept: TELEHEALTH | Age: 26
End: 2021-01-02

## 2021-01-02 DIAGNOSIS — T78.40XA ALLERGIC REACTION, INITIAL ENCOUNTER: Primary | ICD-10-CM

## 2021-01-02 PROCEDURE — 99202 OFFICE O/P NEW SF 15 MIN: CPT | Performed by: NURSE PRACTITIONER

## 2021-01-02 NOTE — PROGRESS NOTES
Florencio Benito is a 30year old female. HPI:   Patient presents with: Other: nasal congestion, sneezing, headache    Encounter was conducted by video visit. Patient reports one day history of sneezing, nasal congestion and fatigue.  She also reports an occ lymphadenopathy  NEURO: neurological changes      EXAM:   [facility] 07/15/2020 (Exact Date)   GENERAL: well developed, well nourished, in no apparent distress. Patient is alert and oriented but anxious.         ASSESSMENT AND PLAN:     ASSESSMENT:  Allergic react

## 2021-01-06 ENCOUNTER — TELEMEDICINE (OUTPATIENT)
Dept: TELEHEALTH | Age: 26
End: 2021-01-06

## 2021-01-06 DIAGNOSIS — U07.1 COVID-19: Primary | ICD-10-CM

## 2021-01-06 PROCEDURE — 99213 OFFICE O/P EST LOW 20 MIN: CPT | Performed by: NURSE PRACTITIONER

## 2021-01-06 NOTE — PROGRESS NOTES
Marisel Grissom is a 30year old female. HPI:   Patient presents with: Other: Covid questions    Encounter was conducted by video visit. Video visit lasted approximately 25 minutes. Patient states she developed Covid symptoms last week.  Reports diarrhea, week.  MUSCULOSKELETAL: no arthralgia or swollen joints  LYMPH:  Denies lymphadenopathy  NEURO: Denies lightheadedness      EXAM:   [facility]MP 07/15/2020 (Exact Date)   GENERAL: well developed, well nourished, in no apparent distress.  Patient is alert and oriente getting infected or exposed. Wear a facemask  You should wear a facemask when you are around other people (e.g., sharing a room or vehicle) or pets and before you enter a healthcare provider's office.  If you are not able to wear a facemask (for example, b effective use of the cleaning product including precautions you should take when applying the product, such as wearing gloves and making sure you have good ventilation during use of the product.   Monitor your symptoms  Seek prompt medical attention if your them that you have or may have COVID-19. For medical emergencies, call 911 and notify the dispatch personnel that you have or may have COVID-19. Cover your mouth and nose when you cough and sneeze.   Wash your hands often with soap and water for at least

## 2021-01-06 NOTE — PATIENT INSTRUCTIONS
Quarantine Advice: Those who have tested positive for COVID should observe a 10-day quarantine period starting the day your symptoms began.  After your 10 day quarantine, you may return to activity and work if your respiratory symptoms have improved and lined trash can; immediately wash your hands with soap and water for at least 20 seconds or clean your hands with an alcohol-based hand  that contains at least 60%-95% alcohol, covering all surfaces of your hands and rubbing them together until th people in the office or waiting room from getting infected or exposed. Ask your healthcare provider to call the local or state health department.  Persons who are placed under active monitoring or facilitated self-monitoring should follow instructions prov outside of the home, wear a facemask. Avoid sharing personal items with other people in your household, like dishes, towels, and bedding. Clean all surfaces that are touched often, like counters, tabletops, and doorknobs.  Use household cleaning sprays or mayonnaise, sauces, gravies, fried foods, peanut butter, meat, poultry, and fish. Don't eat much fiber. Stay away from raw or cooked vegetables, fresh fruits (except bananas), and bran cereals. Limit how much caffeine and chocolate you have.  Do' use any

## 2021-01-12 ENCOUNTER — TELEMEDICINE (OUTPATIENT)
Dept: FAMILY MEDICINE CLINIC | Facility: CLINIC | Age: 26
End: 2021-01-12

## 2021-01-12 DIAGNOSIS — B34.9 VIRAL SYNDROME: Primary | ICD-10-CM

## 2021-01-12 PROCEDURE — 99213 OFFICE O/P EST LOW 20 MIN: CPT | Performed by: FAMILY MEDICINE

## 2021-01-13 NOTE — PROGRESS NOTES
HPI:    Patient ID: Seun Epstein is a 30year old female.     Telehealth outside of [facility] [address] Verbal Consent   I conducted a telehealth visit with Seun Epstein today, 01/12/21, which was completed using two-way, real-time interactive audio and vid dyspnea no cough. Had some diarrhea. .  Now doing much better  Patient asking is she can get a note that she can travel. 15 min spent with the patient on the phone  This is video visit    Review of Systems  Constitutional: Negative.   Negative for ac

## 2021-01-29 ENCOUNTER — LAB ENCOUNTER (OUTPATIENT)
Dept: LAB | Facility: HOSPITAL | Age: 26
End: 2021-01-29
Attending: FAMILY MEDICINE
Payer: MEDICAID

## 2021-01-29 ENCOUNTER — TELEMEDICINE (OUTPATIENT)
Dept: FAMILY MEDICINE CLINIC | Facility: CLINIC | Age: 26
End: 2021-01-29

## 2021-01-29 DIAGNOSIS — Z20.822 EXPOSURE TO COVID-19 VIRUS: Primary | ICD-10-CM

## 2021-01-29 PROCEDURE — 99213 OFFICE O/P EST LOW 20 MIN: CPT | Performed by: FAMILY MEDICINE

## 2021-01-30 LAB — SARS-COV-2 RNA RESP QL NAA+PROBE: NOT DETECTED

## 2021-03-29 ENCOUNTER — NURSE TRIAGE (OUTPATIENT)
Dept: FAMILY MEDICINE CLINIC | Facility: CLINIC | Age: 26
End: 2021-03-29

## 2021-03-29 ENCOUNTER — PATIENT MESSAGE (OUTPATIENT)
Dept: FAMILY MEDICINE CLINIC | Facility: CLINIC | Age: 26
End: 2021-03-29

## 2021-03-29 ENCOUNTER — HOSPITAL ENCOUNTER (OUTPATIENT)
Age: 26
Discharge: HOME | End: 2021-03-29
Payer: MEDICAID

## 2021-03-29 VITALS
RESPIRATION RATE: 20 BRPM | HEART RATE: 82 BPM | DIASTOLIC BLOOD PRESSURE: 64 MMHG | TEMPERATURE: 99 F | SYSTOLIC BLOOD PRESSURE: 124 MMHG | OXYGEN SATURATION: 100 %

## 2021-03-29 DIAGNOSIS — T78.40XA ALLERGIC REACTION, INITIAL ENCOUNTER: Primary | ICD-10-CM

## 2021-03-29 PROCEDURE — 99213 OFFICE O/P EST LOW 20 MIN: CPT

## 2021-03-29 PROCEDURE — 99203 OFFICE O/P NEW LOW 30 MIN: CPT

## 2021-03-29 RX ORDER — BUPROPION HYDROCHLORIDE 150 MG/1
TABLET ORAL
COMMUNITY
Start: 2021-03-14

## 2021-03-29 RX ORDER — PREDNISONE 20 MG/1
40 TABLET ORAL DAILY
Qty: 10 TABLET | Refills: 0 | Status: SHIPPED | OUTPATIENT
Start: 2021-03-29 | End: 2021-04-03

## 2021-03-29 RX ORDER — FAMOTIDINE 20 MG/1
20 TABLET ORAL DAILY
Qty: 5 TABLET | Refills: 0 | Status: SHIPPED | OUTPATIENT
Start: 2021-03-29 | End: 2021-04-03

## 2021-03-29 RX ORDER — ALBUTEROL SULFATE 90 UG/1
2 AEROSOL, METERED RESPIRATORY (INHALATION) EVERY 4 HOURS PRN
Qty: 1 INHALER | Refills: 0 | Status: SHIPPED | OUTPATIENT
Start: 2021-03-29 | End: 2021-04-28

## 2021-03-29 NOTE — TELEPHONE ENCOUNTER
From: Ebony Woodruff  To: Anabel West MD  Sent: 3/29/2021 11:52 AM CDT  Subject: Other    Hello,     I would like to discuss this for our visit tomorrow at 2:45 over video, here are some images for reference.      I'm not sure what this reaction has derived

## 2021-03-29 NOTE — TELEPHONE ENCOUNTER
Encounter routed to the triage pool for RN follow-up.  Please see images in the [employer] message dated 3/29/2021.          ----- Message from Jeanne Cloud sent at 3/29/2021 11:52 AM CDT -----  Regarding: Other  Contact: [phone]  Hello,     I would like

## 2021-03-30 ENCOUNTER — TELEMEDICINE (OUTPATIENT)
Dept: FAMILY MEDICINE CLINIC | Facility: CLINIC | Age: 26
End: 2021-03-30
Payer: MEDICAID

## 2021-03-30 DIAGNOSIS — T78.40XA ALLERGY, INITIAL ENCOUNTER: Primary | ICD-10-CM

## 2021-03-30 DIAGNOSIS — F90.9 ATTENTION DEFICIT HYPERACTIVITY DISORDER (ADHD), UNSPECIFIED ADHD TYPE: ICD-10-CM

## 2021-03-30 PROCEDURE — 99213 OFFICE O/P EST LOW 20 MIN: CPT | Performed by: FAMILY MEDICINE

## 2021-03-30 NOTE — TELEPHONE ENCOUNTER
Action Requested: Summary for Provider     []  Critical Lab, Recommendations Needed  [x] Need Additional Advice  []   FYI    []   Need Orders  [] Need Medications Sent to Pharmacy  []  Other     SUMMARY: Patient complains of rash  that started yesterday.

## 2021-03-30 NOTE — PROGRESS NOTES
HPI/Subjective:   Patient ID: Martha Ayala is a 30year old female.     Telehealth outside of [facility] [address] Verbal Consent   I conducted a telehealth visit with Martha Ayala today, 03/30/21, which was completed using two-way, real-time interactive aud stopped it because she had anxiety attacks. History/Other:   Constitutional: Negative. Negative for activity change, appetite change, diaphoresis and fatigue. Respiratory: Negative.   Negative for apnea, cough, chest tightness and shortness of Continue zyrtec and pepcid   Add- make an apt to see me  No orders of the defined types were placed in this encounter.       Meds This Visit:  Requested Prescriptions      No prescriptions requested or ordered in this encounter       Imaging & Referrals:

## 2021-03-30 NOTE — TELEPHONE ENCOUNTER
Called patient for status update. She states the shortness of breath has gotten better. She still has a cough with a lot of phlegm. And the rash seems to be coming back.  She has not yet started the medications prescribed at the  because the pharmacy was

## 2021-03-30 NOTE — ED PROVIDER NOTES
Patient Seen in: [facility]      History   Patient presents with:  Rash Skin Problem    Stated Complaint: SOB, hives    HPI/Subjective:   HPI    40-year-old female with anxiety, depression here today with hives that have resolved.   Patient re distress. HEENT: Head is normocephalic atraumatic. Pupils reactive bilaterally. EOMs intact. No facial droop or slurred speech. No oral pallor. Mucous membranes moist.      Neck: No cervical lymphadenopathy. No stridor. Supple. No meningsmus. Clinical Impression:  Allergic reaction, initial encounter  (primary encounter diagnosis)    Disposition:  Discharge  3/29/2021  8:02 pm    Follow-up:  Lynnda Eisenmenger, MD  [address] [facility]  [phone]                Medications P

## 2021-03-31 ENCOUNTER — TELEPHONE (OUTPATIENT)
Dept: FAMILY MEDICINE CLINIC | Facility: CLINIC | Age: 26
End: 2021-03-31

## 2021-03-31 NOTE — PROGRESS NOTES
HPI/Subjective:   Patient ID: Jie Steven is a 30year old female. Patient is feeling add   Does not feeling depression. Felt that might be anxious but her main problem is add.    Tried bupropion v=by a psychiatrist and was feeling very jittery and wo Allergies    Objective:   Physical Exam  Physical Exam   Constitutional: She appears well-developed and well-nourished. Cardiovascular: Normal rate, regular rhythm, normal heart sounds and intact distal pulses.    Pulmonary/Chest: Effort normal and breath

## 2021-04-03 NOTE — TELEPHONE ENCOUNTER
Dr Erasmo Garcia- did you want us to file and appeal for the Adderall Script? Please see message below and advise.

## 2021-04-03 NOTE — TELEPHONE ENCOUNTER
From: Pema Moore  To: Oz Nicole MD  Sent: 4/2/2021 1:19 PM CDT  Subject: Visit Namita Fortune,      I hope you have been able to relax and take a breather for yourself.      May you please contact the insurance company so they

## 2021-04-05 NOTE — TELEPHONE ENCOUNTER
Prior authorization for Adderall completed w/ Medicaid on cover my meds Key: [id number], turn around time 1-5 days. No Vaccines Administered.

## 2021-04-05 NOTE — TELEPHONE ENCOUNTER
Medication has not been denied by insurance plan.  PA is needed and initiated today 04/05/2021 at 9:14am.

## 2021-04-06 NOTE — TELEPHONE ENCOUNTER
Dr. Herman Soria, spoke to patient regarding symptoms. Would you be able to add symptoms to office notes. Patient must have at least 5 symptoms that show it is hard for you to pay attention.  Or patient must have at least 5 symptoms that show patient is hyper

## 2021-04-08 NOTE — TELEPHONE ENCOUNTER
Can you resubmit the note for prior authorization or what can we so this gets approved. I just today added more information to the encounter when she visited me/. Let me know.  Or what else can be done

## 2021-04-09 NOTE — TELEPHONE ENCOUNTER
Prior authorization for adderall has been initiated through [employer] using keycode: [id number]  It takes about 1-5 business days for a decision to come back.

## 2021-04-18 NOTE — TELEPHONE ENCOUNTER
[employer] message with recommendation sent to pt.         Future Appointments   Date Time Provider Ben Paulino   4/21/2021 11:00 AM Jeannie Jimenes MD [facility]   8/10/2021  4:00 PM JULIO Womack [facility]

## 2021-04-21 ENCOUNTER — OFFICE VISIT (OUTPATIENT)
Dept: ALLERGY | Facility: CLINIC | Age: 26
End: 2021-04-21

## 2021-04-21 DIAGNOSIS — Z53.29 NO-SHOW FOR APPOINTMENT: Primary | ICD-10-CM

## 2021-05-12 ENCOUNTER — OFFICE VISIT (OUTPATIENT)
Dept: ALLERGY | Facility: CLINIC | Age: 26
End: 2021-05-12

## 2021-05-12 DIAGNOSIS — Z53.29 NO-SHOW FOR APPOINTMENT: Primary | ICD-10-CM

## 2024-03-11 ENCOUNTER — OFFICE VISIT (OUTPATIENT)
Dept: PODIATRY CLINIC | Facility: CLINIC | Age: 29
End: 2024-03-11

## 2024-03-11 VITALS — SYSTOLIC BLOOD PRESSURE: 130 MMHG | DIASTOLIC BLOOD PRESSURE: 70 MMHG

## 2024-03-11 DIAGNOSIS — M25.572 ARTHRALGIA OF ANKLE OR FOOT, LEFT: ICD-10-CM

## 2024-03-11 DIAGNOSIS — M25.572 LEFT ANKLE PAIN, UNSPECIFIED CHRONICITY: Primary | ICD-10-CM

## 2024-03-11 PROCEDURE — 20605 DRAIN/INJ JOINT/BURSA W/O US: CPT | Performed by: PODIATRIST

## 2024-03-11 PROCEDURE — 99203 OFFICE O/P NEW LOW 30 MIN: CPT | Performed by: PODIATRIST

## 2024-03-11 NOTE — PROGRESS NOTES
Per Dr. Fajardo draw up 0.5ml of 0.5% Marcaine and 0.5ml of Kenalog 40 for injection to left heel injection.

## 2024-03-12 ENCOUNTER — PATIENT MESSAGE (OUTPATIENT)
Dept: PODIATRY CLINIC | Facility: CLINIC | Age: 29
End: 2024-03-12

## 2024-03-12 RX ORDER — TRIAMCINOLONE ACETONIDE 40 MG/ML
20 INJECTION, SUSPENSION INTRA-ARTICULAR; INTRAMUSCULAR ONCE
Status: COMPLETED | OUTPATIENT
Start: 2024-03-12 | End: 2024-03-12

## 2024-03-12 RX ADMIN — TRIAMCINOLONE ACETONIDE 20 MG: 40 INJECTION, SUSPENSION INTRA-ARTICULAR; INTRAMUSCULAR at 17:19:00

## 2024-03-13 NOTE — PROGRESS NOTES
Yany Luther is a 28 year old female.   Chief Complaint   Patient presents with    Consult     Left foot wart - pain 9/10 patient states area was raised but has flattened out.   Fungus check bilateral feet - states she has done several treatment of terbinafine  Past left ankle injury still has pain - pain 7/10 with throbbing pain.         HPI:   Pleasant patient presents to the clinic stating that she had an ankle injury gives her pain about 7 out of 10.  This happened last year she has had x-rays and MRI  Physical therapy but the pain is still there preventing her from running.  On the left foot she is also complaining of a painful plantars wart evidently some type of treatment was rendered which is left a painful blood blister.  She has pain there about 9 out of 10 and can barely walk on it.  I did notice that she was limping when she went into the room.  At today's visit reviewed nurse's history as taken above, allergies medications and medical history as documented below.  All changes duly noted  Allergies: Patient has no known allergies.   Current Outpatient Medications   Medication Sig Dispense Refill    buPROPion HCl ER, XL, 150 MG Oral Tablet 24 Hr       ketoconazole 2 % External Cream Apply 1 Application topically 2 (two) times daily. Apply to affected area 2 times daily. 30 g 3    Adapalene (DIFFERIN) 0.1 % External Gel Use qhs for acne 45 g 1    Terbinafine HCl 250 MG Oral Tab Take 1 tablet (250 mg total) by mouth daily.      Meloxicam 15 MG Oral Tab Take 1 tablet (15 mg total) by mouth daily. 30 tablet 0    triamcinolone acetonide 0.1 % External Cream Apply topically 2 (two) times daily as needed. 60 g 0      Past Medical History:   Diagnosis Date    Anxiety state, unspecified     Depression       Past Surgical History:   Procedure Laterality Date    BREAST SURGERY Bilateral 2008    breast augmentation       Family History   Problem Relation Age of Onset    Depression Mother         anixety    Anxiety  Mother     Hypertension Mother     Diabetes Father     Hypertension Father     Heart Disorder Father         bypass surg    Other (Other) Father         Hyperlipidemia    Diabetes Other         History of Diabetes    Hypertension Other         History of Hypertension    Glaucoma Neg         No glaucoma history      Social History     Socioeconomic History    Marital status: Single   Tobacco Use    Smoking status: Never    Smokeless tobacco: Never   Vaping Use    Vaping Use: Never used   Substance and Sexual Activity    Alcohol use: Yes     Comment: Socially    Drug use: Yes     Types: Cannabis    Sexual activity: Yes     Partners: Male   Other Topics Concern    Caffeine Concern Yes     Comment: Coffee, 3 cups daily    Reaction to local anesthetic No           REVIEW OF SYSTEMS:   Today reviewed systens as documented below  GENERAL HEALTH: feels well otherwise  SKIN: Refer to exam below  RESPIRATORY: denies shortness of breath with exertion  CARDIOVASCULAR: denies chest pain on exertion  GI: denies abdominal pain and denies heartburn  NEURO: denies headaches    EXAM:   /70 (BP Location: Right arm, Patient Position: Sitting, Cuff Size: adult)   GENERAL: well developed, well nourished, in no apparent distress  EXTREMITIES:   1. Integument: The skin on her left foot and ankle was evaluated is warm and dry turgor is relatively normal on the plantar surface of the left foot underneath the fourth metatarsal base area there is a blackened eschar to be a dried blood blister from treatment for a wart.  It is painful to palpation.  Mild edema noted around the ankle joint but not severe compared to the right.   2. Vascular: Patient has palpable dorsalis pedis posterior tibial pulses bilateral   3. Neurologic: Has intact sensorium   4. Musculoskeletal: Patient has pain which seems to be more at the anterior to anterior medial aspect of her left ankle.  Range of motion of the ankle does not produce crepitation or any  mechanical grinding.  X-rays were taken 3 views of the left ankle.  I did not notice any osseous defects there does not seem to be any evidence of an osteochondral lesion in the ankle joint mortise is clean and not interrupted.  There is no increase in the medial clear space.  No ankle joint deformity.  EXTR no dislocation.  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  No associated chondral defect.     No acute meniscal or ligamentous injury.     1.3 cm ganglion cyst seen along the superior margin of the popliteal recess, likely incidental.           Dictated by (CST): Ari Marie MD on 8/16/2020 at 8:21 PM      Finalized by (CST): Ari Marie MD on 8/16/2020 at 8:28 PM    I did review the MRI findings cemented above however the cyst that they are talking about is not located anywhere is near her symptomatic area on the ankle joint.  ASSESSMENT AND PLAN:   Diagnoses and all orders for this visit:    Left ankle pain, unspecified chronicity  -     triamcinolone acetonide (Kenalog-40) 40 MG/ML injection 20 mg    Arthralgia of ankle or foot, left  -     triamcinolone acetonide (Kenalog-40) 40 MG/ML injection 20 mg    Other orders  -     [facility] AMB POD XR - LT ANKLE 3 VIEWS(AP,LAT,MORTISE)WT BEARING        Plan: I feel this could be a chronic synovitis.  Will try 1 cortisone injection.Today discussed the nature and extent of a cortisone injection as well as the possible complications and risks.  Patient was in agreement to receive the injection.  The patient was consented for a cortisone injection and after timeout was taken the injection consisting of 20 mg Kenalog and 0.5 cc of 0.5% Marcaine plain was injected into the symptomatic left ankle joint using aseptic technique and appropriate approach.  A Band-Aid was applied to the injection site.   Since the accommodative padding for her wart we will see her back in follow-up in a few weeks and see how she is doing advised against running  for at least a week she should just rested for a week and that would include yoga as well.  The patient indicates understanding of these issues and agrees to the plan.    Barak Fajardo DPM

## 2024-03-26 ENCOUNTER — OFFICE VISIT (OUTPATIENT)
Dept: PODIATRY CLINIC | Facility: CLINIC | Age: 29
End: 2024-03-26

## 2024-03-26 DIAGNOSIS — G89.29 CHRONIC PAIN OF RIGHT ANKLE: ICD-10-CM

## 2024-03-26 DIAGNOSIS — M25.571 CHRONIC PAIN OF RIGHT ANKLE: ICD-10-CM

## 2024-03-26 DIAGNOSIS — L30.9 DERMATITIS OF FOOT: ICD-10-CM

## 2024-03-26 DIAGNOSIS — B35.3 TINEA PEDIS OF BOTH FEET: ICD-10-CM

## 2024-03-26 DIAGNOSIS — M25.572 ARTHRALGIA OF ANKLE OR FOOT, LEFT: ICD-10-CM

## 2024-03-26 DIAGNOSIS — M25.572 LEFT ANKLE PAIN, UNSPECIFIED CHRONICITY: Primary | ICD-10-CM

## 2024-03-26 PROCEDURE — 99213 OFFICE O/P EST LOW 20 MIN: CPT | Performed by: PODIATRIST

## 2024-03-26 RX ORDER — CLOTRIMAZOLE AND BETAMETHASONE DIPROPIONATE 10; .64 MG/G; MG/G
CREAM TOPICAL
Qty: 45 G | Refills: 0 | Status: SHIPPED | OUTPATIENT
Start: 2024-03-26

## 2024-03-26 NOTE — PROGRESS NOTES
Yany Luther is a 28 year old female.   Chief Complaint   Patient presents with    Follow - Up     L  ankle f/u - Pt was given injection on 03/11/24- Still swelling - Still having pain with dorsi flexion . No numbness or tingling.  Pt states R ankle is giving her pain due to L  ankle.   R ankle - 6/10         HPI:   Returns to the clinic for left ankle follow-up states that she does not have improvement with the cortisone injection still has some right leg pain.  At today's visit reviewed nurse's history as taken above, allergies medications and medical history as documented below.  All changes duly noted  Allergies: Patient has no known allergies.   Current Outpatient Medications   Medication Sig Dispense Refill    clotrimazole-betamethasone 1-0.05 % External Cream Rub in thoroughly to all affected areas of feet twice daily and then wash hands thoroughly 45 g 0    buPROPion HCl ER, XL, 150 MG Oral Tablet 24 Hr       ketoconazole 2 % External Cream Apply 1 Application topically 2 (two) times daily. Apply to affected area 2 times daily. 30 g 3    Adapalene (DIFFERIN) 0.1 % External Gel Use qhs for acne 45 g 1    Terbinafine HCl 250 MG Oral Tab Take 1 tablet (250 mg total) by mouth daily.      Meloxicam 15 MG Oral Tab Take 1 tablet (15 mg total) by mouth daily. 30 tablet 0    triamcinolone acetonide 0.1 % External Cream Apply topically 2 (two) times daily as needed. 60 g 0      Past Medical History:   Diagnosis Date    Anxiety state, unspecified     Depression       Past Surgical History:   Procedure Laterality Date    BREAST SURGERY Bilateral 2008    breast augmentation       Family History   Problem Relation Age of Onset    Depression Mother         anixety    Anxiety Mother     Hypertension Mother     Diabetes Father     Hypertension Father     Heart Disorder Father         bypass surg    Other (Other) Father         Hyperlipidemia    Diabetes Other         History of Diabetes    Hypertension Other         History  of Hypertension    Glaucoma Neg         No glaucoma history      Social History     Socioeconomic History    Marital status: Single   Tobacco Use    Smoking status: Never    Smokeless tobacco: Never   Vaping Use    Vaping Use: Never used   Substance and Sexual Activity    Alcohol use: Yes     Comment: Socially    Drug use: Yes     Types: Cannabis    Sexual activity: Yes     Partners: Male   Other Topics Concern    Caffeine Concern Yes     Comment: Coffee, 3 cups daily    Reaction to local anesthetic No           REVIEW OF SYSTEMS:   Today reviewed systens as documented below  GENERAL HEALTH: feels well otherwise  SKIN: Refer to exam below  RESPIRATORY: denies shortness of breath with exertion  CARDIOVASCULAR: denies chest pain on exertion  GI: denies abdominal pain and denies heartburn  NEURO: denies headaches    EXAM:   There were no vitals taken for this visit.  GENERAL: well developed, well nourished, in no apparent distress  EXTREMITIES:   1. Integument: The skin on her left ankle seems to be less swollen right ankle still has a little bit of swelling.  Might have an Achilles tendinitis on the right.  She complains of a rash on the arch of her right foot.  It also has some raised lesions looks like not only a tinea but a secondary dermatitis.   2. Vascular: Patient has palpable pulses   3. Neurologic: Has intact sensorium   4. Musculoskeletal: Patient has most of her discomfort when she plantar flexes her foot that seems to stretch out the anterior ankle joint on her left which is where its most painful.    ASSESSMENT AND PLAN:   Diagnoses and all orders for this visit:    Left ankle pain, unspecified chronicity  -     US ANKLE BILATERAL COMPLETE (CPT=76881); Future    Arthralgia of ankle or foot, left  -     US ANKLE BILATERAL COMPLETE (CPT=76881); Future    Chronic pain of right ankle  -     US ANKLE BILATERAL COMPLETE (CPT=76881); Future    Tinea pedis of both feet  -     clotrimazole-betamethasone 1-0.05 %  External Cream; Rub in thoroughly to all affected areas of feet twice daily and then wash hands thoroughly    Dermatitis of foot  -     clotrimazole-betamethasone 1-0.05 % External Cream; Rub in thoroughly to all affected areas of feet twice daily and then wash hands thoroughly        Plan: Today I prescribed Lotrisone for her rash.  In addition to that I ordered a diagnostic ultrasound so that her problems could be evaluated in real time and see exactly what they are we will send her to . Balwinder Price was very renowned for his ability in this.    The patient indicates understanding of these issues and agrees to the plan.    Barak Fajardo DPM

## 2024-03-29 ENCOUNTER — TELEPHONE (OUTPATIENT)
Dept: PODIATRY CLINIC | Facility: CLINIC | Age: 29
End: 2024-03-29

## 2024-04-01 NOTE — TELEPHONE ENCOUNTER
Released Rx to pharmacy    Updated patient on this information and told her to contact us if she had any other questions

## 2024-06-12 ENCOUNTER — TELEPHONE (OUTPATIENT)
Dept: PODIATRY CLINIC | Facility: CLINIC | Age: 29
End: 2024-06-12

## 2024-06-17 NOTE — TELEPHONE ENCOUNTER
S/w patient- Booked procedure for 30 minutes for wart removal procedure on 7/23/24 at 3:45. Patient has 15 minute appointment on 6/27/24 to follow up on ankle pain. She states that she also has concerns for toenail fungus. I told her that he could assess the toenail fungus on 6/27/24. She was wondering if she should have terbinafine and I told her that Dr Fajardo would want to assess her first before sending any terbinafine for nail fungus

## 2024-06-27 ENCOUNTER — OFFICE VISIT (OUTPATIENT)
Dept: PODIATRY CLINIC | Facility: CLINIC | Age: 29
End: 2024-06-27

## 2024-06-27 DIAGNOSIS — B07.0 VERRUCA PLANTARIS: Primary | ICD-10-CM

## 2024-06-27 DIAGNOSIS — B35.3 TINEA PEDIS OF BOTH FEET: ICD-10-CM

## 2024-06-27 PROCEDURE — 99213 OFFICE O/P EST LOW 20 MIN: CPT | Performed by: PODIATRIST

## 2024-06-30 NOTE — PROGRESS NOTES
Yany Luther is a 28 year old female.   Chief Complaint   Patient presents with    Warts     L foot f/u - Pt states wart have gotten worse. Pt states pain when walking.          HPI:   Returns to clinic for follow-up on her left foot where she has been applying acid to them.  Wants to have it removed.  At today's visit reviewed nurse's history as taken above, allergies medications and medical history as documented below.  All changes duly noted  Allergies: Patient has no known allergies.   Current Outpatient Medications   Medication Sig Dispense Refill    clotrimazole-betamethasone 1-0.05 % External Cream Rub in thoroughly to all affected areas of feet twice daily and then wash hands thoroughly 45 g 0    Terbinafine HCl 250 MG Oral Tab Take 1 tablet (250 mg total) by mouth daily.      buPROPion HCl ER, XL, 150 MG Oral Tablet 24 Hr       ketoconazole 2 % External Cream Apply 1 Application topically 2 (two) times daily. Apply to affected area 2 times daily. 30 g 3    Adapalene (DIFFERIN) 0.1 % External Gel Use qhs for acne 45 g 1    Meloxicam 15 MG Oral Tab Take 1 tablet (15 mg total) by mouth daily. 30 tablet 0    triamcinolone acetonide 0.1 % External Cream Apply topically 2 (two) times daily as needed. 60 g 0      Past Medical History:    Anxiety state, unspecified    Depression      Past Surgical History:   Procedure Laterality Date    Breast surgery Bilateral 2008    breast augmentation       Family History   Problem Relation Age of Onset    Depression Mother         anixety    Anxiety Mother     Hypertension Mother     Diabetes Father     Hypertension Father     Heart Disorder Father         bypass surg    Other (Other) Father         Hyperlipidemia    Diabetes Other         History of Diabetes    Hypertension Other         History of Hypertension    Glaucoma Neg         No glaucoma history      Social History     Socioeconomic History    Marital status: Single   Tobacco Use    Smoking status: Never    Smokeless  tobacco: Never   Vaping Use    Vaping status: Never Used   Substance and Sexual Activity    Alcohol use: Yes     Comment: Socially    Drug use: Yes     Types: Cannabis    Sexual activity: Yes     Partners: Male   Other Topics Concern    Caffeine Concern Yes     Comment: Coffee, 3 cups daily    Reaction to local anesthetic No           REVIEW OF SYSTEMS:   Today reviewed systens as documented below  GENERAL HEALTH: feels well otherwise  SKIN: Refer to exam below  RESPIRATORY: denies shortness of breath with exertion  CARDIOVASCULAR: denies chest pain on exertion  GI: denies abdominal pain and denies heartburn  NEURO: denies headaches    EXAM:   There were no vitals taken for this visit.  GENERAL: well developed, well nourished, in no apparent distress  EXTREMITIES:   1. Integument: The skin on her left foot was evaluated is warm and dry she has a plantar dermal papillomatous lesion with loosening of the skin from the acid application which was trimmed and debrided off at today's visit.  The patient does have some evidence of tinea pedis some desquamation on her feet may best be treated with oral Lamisil tablets but first we have to get hepatic function panel   2. Vascular: Patient has palpable pulses   3. Neurologic: Has intact sensorium   4. Musculoskeletal: Has good muscle strength.    ASSESSMENT AND PLAN:   Diagnoses and all orders for this visit:    Verruca plantaris    Tinea pedis of both feet  -     Hepatic Function Panel (7); Future        Plan: We will plan to take the wart off we can do that in the office here.  To return to have that done we will call her with the results of the [place] function panel and then get her started on 4 to 6-week dose of the Lamisil tablets.    The patient indicates understanding of these issues and agrees to the plan.    Barak Fajardo DPM

## 2024-07-27 ENCOUNTER — LAB ENCOUNTER (OUTPATIENT)
Dept: LAB | Facility: REFERENCE LAB | Age: 29
End: 2024-07-27
Attending: PODIATRIST
Payer: COMMERCIAL

## 2024-07-27 DIAGNOSIS — B35.3 TINEA PEDIS OF BOTH FEET: ICD-10-CM

## 2024-07-27 LAB
ALBUMIN SERPL-MCNC: 4.7 G/DL (ref 3.2–4.8)
ALP LIVER SERPL-CCNC: 77 U/L
ALT SERPL-CCNC: 25 U/L
AST SERPL-CCNC: 19 U/L (ref ?–34)
BILIRUB DIRECT SERPL-MCNC: 0.2 MG/DL (ref ?–0.3)
BILIRUB SERPL-MCNC: 0.8 MG/DL (ref 0.3–1.2)
PROT SERPL-MCNC: 7.3 G/DL (ref 5.7–8.2)

## 2024-07-27 PROCEDURE — 36415 COLL VENOUS BLD VENIPUNCTURE: CPT

## 2024-07-27 PROCEDURE — 80076 HEPATIC FUNCTION PANEL: CPT

## 2024-07-29 ENCOUNTER — OFFICE VISIT (OUTPATIENT)
Dept: PODIATRY CLINIC | Facility: CLINIC | Age: 29
End: 2024-07-29
Payer: COMMERCIAL

## 2024-07-29 VITALS — SYSTOLIC BLOOD PRESSURE: 128 MMHG | DIASTOLIC BLOOD PRESSURE: 64 MMHG

## 2024-07-29 DIAGNOSIS — G89.18 POSTOPERATIVE PAIN: ICD-10-CM

## 2024-07-29 DIAGNOSIS — B07.0 VERRUCA PLANTARIS: Primary | ICD-10-CM

## 2024-07-29 RX ORDER — HYDROCODONE BITARTRATE AND ACETAMINOPHEN 5; 325 MG/1; MG/1
1 TABLET ORAL EVERY 6 HOURS PRN
Qty: 20 TABLET | Refills: 0 | Status: SHIPPED | OUTPATIENT
Start: 2024-07-29

## 2024-07-29 RX ORDER — DOXYCYCLINE HYCLATE 100 MG/1
100 CAPSULE ORAL 2 TIMES DAILY
Qty: 14 CAPSULE | Refills: 0 | Status: SHIPPED | OUTPATIENT
Start: 2024-07-29

## 2024-07-29 NOTE — PROGRESS NOTES
Per Dr. Fajardo verbal order, to draw up 5ml of 0.5% Marcaine for Wart removal left foot using Hyfrecator Machine procedure.

## 2024-07-29 NOTE — PROGRESS NOTES
Yany Luther is a 29 year old female.   Chief Complaint   Patient presents with    Procedure     Plantar wart left foot- denies pain         HPI:   Patient returns the clinic with her mother present.  She is here to have her wart removed from the bottom of the left foot.  At today's visit reviewed nurse's history as taken above, allergies medications and medical history as documented below.  All changes duly noted  Allergies: Patient has no known allergies.   Current Outpatient Medications   Medication Sig Dispense Refill    doxycycline 100 MG Oral Cap Take 1 capsule (100 mg total) by mouth 2 (two) times daily. 14 capsule 0    silver sulfADIAZINE 1 % External Cream Apply to surgical sites twice daily after soaking and cover with absorbent Band-Aid 50 g 1    HYDROcodone-acetaminophen 5-325 MG Oral Tab Take 1 tablet by mouth every 6 (six) hours as needed for Pain. 20 tablet 0    clotrimazole-betamethasone 1-0.05 % External Cream Rub in thoroughly to all affected areas of feet twice daily and then wash hands thoroughly 45 g 0    Terbinafine HCl 250 MG Oral Tab Take 1 tablet (250 mg total) by mouth daily.      buPROPion HCl ER, XL, 150 MG Oral Tablet 24 Hr       ketoconazole 2 % External Cream Apply 1 Application topically 2 (two) times daily. Apply to affected area 2 times daily. 30 g 3    Adapalene (DIFFERIN) 0.1 % External Gel Use qhs for acne 45 g 1    Meloxicam 15 MG Oral Tab Take 1 tablet (15 mg total) by mouth daily. 30 tablet 0    triamcinolone acetonide 0.1 % External Cream Apply topically 2 (two) times daily as needed. 60 g 0      Past Medical History:    Anxiety state, unspecified    Depression      Past Surgical History:   Procedure Laterality Date    Breast surgery Bilateral 2008    breast augmentation       Family History   Problem Relation Age of Onset    Depression Mother         anixety    Anxiety Mother     Hypertension Mother     Diabetes Father     Hypertension Father     Heart Disorder Father          bypass surg    Other (Other) Father         Hyperlipidemia    Diabetes Other         History of Diabetes    Hypertension Other         History of Hypertension    Glaucoma Neg         No glaucoma history      Social History     Socioeconomic History    Marital status: Single   Tobacco Use    Smoking status: Never    Smokeless tobacco: Never   Vaping Use    Vaping status: Never Used   Substance and Sexual Activity    Alcohol use: Yes     Comment: Socially    Drug use: Yes     Types: Cannabis    Sexual activity: Yes     Partners: Male   Other Topics Concern    Caffeine Concern Yes     Comment: Coffee, 3 cups daily    Reaction to local anesthetic No           REVIEW OF SYSTEMS:   Today reviewed systens as documented below  GENERAL HEALTH: feels well otherwise  SKIN: Refer to exam below  RESPIRATORY: denies shortness of breath with exertion  CARDIOVASCULAR: denies chest pain on exertion  GI: denies abdominal pain and denies heartburn  NEURO: denies headaches    EXAM:   /64 (BP Location: Left arm, Patient Position: Sitting, Cuff Size: adult)   GENERAL: well developed, well nourished, in no apparent distress  EXTREMITIES:   1. Integument: The skin on the bottom of the left foot shows that there is a verrucous lesion measuring approximately 1.5 cm in diameter.  Trimming of that was taken will be sent to pathology.   2. Vascular: Patient has palpable pulses   3. Neurologic: Patient has intact sense   4. Musculoskeletal: Patient has good muscle strength and is ambulatory.    ASSESSMENT AND PLAN:   Diagnoses and all orders for this visit:    Verruca plantaris  -     HYDROcodone-acetaminophen 5-325 MG Oral Tab; Take 1 tablet by mouth every 6 (six) hours as needed for Pain.  -     Specimen to Pathology, Tissue [E]; Future    Postoperative pain  -     HYDROcodone-acetaminophen 5-325 MG Oral Tab; Take 1 tablet by mouth every 6 (six) hours as needed for Pain.    Other orders  -     doxycycline 100 MG Oral Cap; Take 1 capsule  (100 mg total) by mouth 2 (two) times daily.  -     silver sulfADIAZINE 1 % External Cream; Apply to surgical sites twice daily after soaking and cover with absorbent Band-Aid        Plan: Today consented the patient for hyfrecation of the lesion which is electrical burning with a needle I explained the nature and extent of the procedure possible risks and complications as well as benefits questions were invited and answered they wish to proceed and consent was signed.    Preprocedure diagnoses: Verruca plantaris 1.5 cm lesion plantar left foot  Postprocedure diagnoses: Same  Procedure: Electric hyfrecation of verruca plantaris plantar left foot    Technique: At today's office visit the patient was consented for the above procedure.  Appropriate timeout was taken and the procedure was carried out as follows:  The area was prepped and draped using usual aseptic technique.   The area was anesthetized with approximately 10 cc of 0.5% Marcaine plain via infiltrative block below the lesion and in a V-pattern proximal to it.  Hyfrecator was set at 16 W/cm² the trimming was taken to send for biopsy.  Hemorrhaging was controlled with direct pressure the Hyfrecator was then used in a circumferential pattern from the periphery to the concentric center until afsaneh was noted utilizing a disposable curette as well as a regular curette the area was debrided any remaining verrucous tissue was then rehyfrecated at 12 W/cm².  The area was again debrided with a 15 blade as well as a disposable curette until normal skin lines were visualized in the bottom and the area was cauterized at 8 W/cm² dressed with antibiotic ointment gauze ABD pad as well as a Coban wrap for hemostasis.    They were told to keep off the foot keep it elevated will also wrote a prescription for Norco in case she needs it placed her on doxycycline for 1 week and she will begin soaking again on Thursday.  She will use warm soapy water 15 minutes each time apply  Silvadene cream and a Band-Aid.  She was given a surgical shoe at today's visit to help offload the area I will see her again in 2 weeks for checkup but she can call if she has any questions or problems I did tell him to expect some bleeding as well as drainage from the area.  They understood all instructions they were cautioned on signs of an infection go to the emergency room and have me paged should they occur.  The patient indicates understanding of these issues and agrees to the plan.    Barak Fajardo DPM

## 2024-08-02 ENCOUNTER — TELEPHONE (OUTPATIENT)
Dept: PODIATRY CLINIC | Facility: CLINIC | Age: 29
End: 2024-08-02

## 2024-08-02 ENCOUNTER — PATIENT MESSAGE (OUTPATIENT)
Dept: PODIATRY CLINIC | Facility: CLINIC | Age: 29
End: 2024-08-02

## 2024-08-02 NOTE — TELEPHONE ENCOUNTER
Sent high priority to MD per [employer] message with picture attached. Will call patient when reply is received.

## 2024-08-02 NOTE — TELEPHONE ENCOUNTER
Patient left following mychart message today, also sent picture. Please call thank you.    The dressing is sternly stuck deeply inside the wound.      I’ve tried to soak it for over 45 minutes in warm and hot soapy water.      I’ve covered it with a hot towel to see if it can come off.      It hurts tremendously to try and pull it off seeing as it’s not a non stick pad.      Please advise what I need to do to avoid an infection

## 2024-08-05 NOTE — TELEPHONE ENCOUNTER
Called patient and scheduled appointment on 8/6 at 845am with Dr Medeiros. Per Dr medeiros there will not be a copay. Advised if any signs of infection prior to appointment to go to urgent care for eval.

## 2024-08-06 ENCOUNTER — OFFICE VISIT (OUTPATIENT)
Dept: PODIATRY CLINIC | Facility: CLINIC | Age: 29
End: 2024-08-06

## 2024-08-06 DIAGNOSIS — B35.1 ONYCHOMYCOSIS: ICD-10-CM

## 2024-08-06 DIAGNOSIS — Z98.890 POST-OPERATIVE STATE: Primary | ICD-10-CM

## 2024-08-06 DIAGNOSIS — B35.3 TINEA PEDIS, LEFT: ICD-10-CM

## 2024-08-06 PROCEDURE — 99024 POSTOP FOLLOW-UP VISIT: CPT | Performed by: PODIATRIST

## 2024-08-06 PROCEDURE — 99213 OFFICE O/P EST LOW 20 MIN: CPT | Performed by: PODIATRIST

## 2024-08-06 NOTE — PROGRESS NOTES
Yany Luther is a 29 year old female.   Chief Complaint   Patient presents with    Warts     post procedure f/u for Left foot wart removal  hyfrecator was used on 7/29/24. Pain 7/10 worse when walking, gauze was stock inside of wound for 2 days; wound bed pink, with slight rochelle wound maceration. Using post op shoe.         HPI:   Patient presents to the clinic for postoperative checkup.  She is questioning the biopsy result.  I told her it was a superficial scraping in order to get that I would have to make an incision and actually cut the wart out.  She is also questioning the fact that she still has tinea infection between the toes and she could have onychomycosis on her hallux nail.  At today's visit reviewed nurse's history as taken above, allergies medications and medical history as documented below.  All changes duly noted  Allergies: Patient has no known allergies.   Current Outpatient Medications   Medication Sig Dispense Refill    silver sulfADIAZINE 1 % External Cream Apply to surgical sites twice daily after soaking and cover with absorbent Band-Aid 50 g 1    clotrimazole-betamethasone 1-0.05 % External Cream Rub in thoroughly to all affected areas of feet twice daily and then wash hands thoroughly 45 g 0    Terbinafine HCl 250 MG Oral Tab Take 1 tablet (250 mg total) by mouth daily.      buPROPion HCl ER, XL, 150 MG Oral Tablet 24 Hr       ketoconazole 2 % External Cream Apply 1 Application topically 2 (two) times daily. Apply to affected area 2 times daily. 30 g 3    Adapalene (DIFFERIN) 0.1 % External Gel Use qhs for acne 45 g 1    Meloxicam 15 MG Oral Tab Take 1 tablet (15 mg total) by mouth daily. 30 tablet 0    triamcinolone acetonide 0.1 % External Cream Apply topically 2 (two) times daily as needed. 60 g 0      Past Medical History:    Anxiety state, unspecified    Depression      Past Surgical History:   Procedure Laterality Date    Breast surgery Bilateral 2008    breast augmentation        Family History   Problem Relation Age of Onset    Depression Mother         anixety    Anxiety Mother     Hypertension Mother     Diabetes Father     Hypertension Father     Heart Disorder Father         bypass surg    Other (Other) Father         Hyperlipidemia    Diabetes Other         History of Diabetes    Hypertension Other         History of Hypertension    Glaucoma Neg         No glaucoma history      Social History     Socioeconomic History    Marital status: Single   Tobacco Use    Smoking status: Never    Smokeless tobacco: Never   Vaping Use    Vaping status: Never Used   Substance and Sexual Activity    Alcohol use: Yes     Comment: Socially    Drug use: Yes     Types: Cannabis    Sexual activity: Yes     Partners: Male   Other Topics Concern    Caffeine Concern Yes     Comment: Coffee, 3 cups daily    Reaction to local anesthetic No           REVIEW OF SYSTEMS:   Today reviewed systens as documented below  GENERAL HEALTH: feels well otherwise  SKIN: Refer to exam below  RESPIRATORY: denies shortness of breath with exertion  CARDIOVASCULAR: denies chest pain on exertion  GI: denies abdominal pain and denies heartburn  NEURO: denies headaches    EXAM:   There were no vitals taken for this visit.  GENERAL: well developed, well nourished, in no apparent distress  EXTREMITIES:   1. Integument: Her left hallux nail is thickened and irregular clipping was taken for fungal culture she does have small macular rash around the toes.  Consistent with chronic tinea the wart surgical site appears to be healing uneventfully without sign of infection   2. Vascular: Patient has palpable pulses   3. Neurologic: Patient has intact sensorium   4. Musculoskeletal: Patient has good muscle strength.    ASSESSMENT AND PLAN:   Diagnoses and all orders for this visit:    Post-operative state    Onychomycosis  -     Dermatophyte Only, Culture [E]; Future    Tinea pedis, left        Plan: Today explained to the patient that she  might have to be on Lamisil tablets for 3 months I am not sure whether the clipping of the toenail will be sufficient evidently a podiatrist she saw before took another clipping but that was sometime ago.  Therefore we will repeat rebiopsy today to see if it still present the patient said she had laser technique she thinks the distal part of the nail may be still infected she is not sure but when I examined the nail she still has some overall thickening so I question as to whether or not she was ever cured.  Explained all this to the patient we will wait for the biopsy results her liver function is normal so we can get her on Lamisil tablets but for right now she will just wash the surgical site on the bottom of her left foot once daily apply the Silvadene cream and a Band-Aid.    The patient indicates understanding of these issues and agrees to the plan.    Barak Fajardo DPM

## 2024-08-26 ENCOUNTER — TELEPHONE (OUTPATIENT)
Dept: PODIATRY CLINIC | Facility: CLINIC | Age: 29
End: 2024-08-26

## 2024-08-26 RX ORDER — CLOTRIMAZOLE AND BETAMETHASONE DIPROPIONATE 10; .64 MG/G; MG/G
CREAM TOPICAL
Qty: 45 G | Refills: 1 | Status: SHIPPED | OUTPATIENT
Start: 2024-08-26

## 2024-08-26 NOTE — TELEPHONE ENCOUNTER
Patient calling for refill of ketoconazole. Patient also asking if she can go into lake or pool water with her wound. See patient messages from 8/2 and 7/29. Please call.      Current Outpatient Medications:     ketoconazole 2 % External Cream, Apply 1 Application topically 2 (two) times daily. Apply to affected area 2 times daily., Disp: 30 g, Rfl: 3

## 2024-08-26 NOTE — TELEPHONE ENCOUNTER
Spoke with patient. She has been using lamisil for toenail fungus and had wart hyfrecation 7/29/24 for which she is still applying the silvadine cream. There were some photos sent earlier through [employer] of the wart area in an 8/2/24 [employer] message. I did advise to stay out of lakes but was not sure about a pool. Since starting the terbinafine her toes have gotten very flaky and it has spread in the past up the foot.    She is wanting a cream for the flakiness on her toes, she has asked in separate encounters for ketoconazole and clotrimazole    She is wondering if she should continue the silvadine cream on the wart area or switch to something like neosporin

## 2024-09-11 ENCOUNTER — TELEPHONE (OUTPATIENT)
Dept: PODIATRY CLINIC | Facility: CLINIC | Age: 29
End: 2024-09-11

## 2024-09-11 NOTE — TELEPHONE ENCOUNTER
Spoke wit and grace patient and let her know results after verifying ID. I did advise of kerasal and where to get it. She was wondering if the kerasal was necessary and also if she should continue to take the terbinafine she has been taking.  I advised that the kerasal, I believe is a suggestion, and that there are other natural remedies such as tea tree oil that some people use for toenail discoloration issues to hold off on the terbinafine and I would reach out to doctor.

## 2024-09-11 NOTE — TELEPHONE ENCOUNTER
----- Message from Barak Fajardo sent at 9/6/2024  1:03 PM CDT -----  Results reviewed.  Please inform patient that fungal culture results are negative and we should proceed with kerasol therapy.  Please tell the patient to purchase it at their pharmacy of choice and follow label directions and then follow-up with me in 3 months.

## 2024-09-13 NOTE — TELEPHONE ENCOUNTER
Patient is not taking the Lamisil for her toenails because she had a chronic athlete's foot infection in the toes and webspace area.  She should finish it and follow-up with us if it does not resolve within about 4 weeks

## 2024-09-23 ENCOUNTER — OFFICE VISIT (OUTPATIENT)
Dept: PODIATRY CLINIC | Facility: CLINIC | Age: 29
End: 2024-09-23
Payer: COMMERCIAL

## 2024-09-23 DIAGNOSIS — Z98.890 POST-OPERATIVE STATE: ICD-10-CM

## 2024-09-23 DIAGNOSIS — S93.492D SPRAIN OF ANTERIOR TALOFIBULAR LIGAMENT OF LEFT ANKLE, SUBSEQUENT ENCOUNTER: Primary | ICD-10-CM

## 2024-09-23 PROCEDURE — 99213 OFFICE O/P EST LOW 20 MIN: CPT | Performed by: PODIATRIST

## 2024-09-27 NOTE — PROGRESS NOTES
Yany Luther is a 29 year old female.   Chief Complaint   Patient presents with    Follow - Up     Plantar wart left foot follow up- denies pain does have sensitivity when cleaning area. PT referral - removed a callus on left hallux area is irritated.         HPI:   Patient returns to the clinic for a checkup on her wart and follow-up on the bumps and tinea on her feet.  At today's visit reviewed nurse's history as taken above, allergies medications and medical history as documented below.  All changes duly noted  Allergies: Patient has no known allergies.   Current Outpatient Medications   Medication Sig Dispense Refill    clotrimazole-betamethasone 1-0.05 % External Cream Apply to affected areas of foot twice a day and rub in until dry 45 g 1    silver sulfADIAZINE 1 % External Cream Apply to surgical sites twice daily after soaking and cover with absorbent Band-Aid 50 g 1    clotrimazole-betamethasone 1-0.05 % External Cream Rub in thoroughly to all affected areas of feet twice daily and then wash hands thoroughly 45 g 0    terbinafine (LAMISIL) 250 MG Oral Tab Take 1 tablet (250 mg total) by mouth daily. (Patient not taking: Reported on 9/23/2024) 45 tablet 0    buPROPion HCl ER, XL, 150 MG Oral Tablet 24 Hr       ketoconazole 2 % External Cream Apply 1 Application topically 2 (two) times daily. Apply to affected area 2 times daily. 30 g 3    Adapalene (DIFFERIN) 0.1 % External Gel Use qhs for acne 45 g 1    Terbinafine HCl 250 MG Oral Tab Take 1 tablet (250 mg total) by mouth daily. (Patient not taking: Reported on 9/23/2024)      Meloxicam 15 MG Oral Tab Take 1 tablet (15 mg total) by mouth daily. 30 tablet 0    triamcinolone acetonide 0.1 % External Cream Apply topically 2 (two) times daily as needed. 60 g 0      Past Medical History:    Anxiety state, unspecified    Depression      Past Surgical History:   Procedure Laterality Date    Breast surgery Bilateral 2008    breast augmentation       Family History    Problem Relation Age of Onset    Depression Mother         anixety    Anxiety Mother     Hypertension Mother     Diabetes Father     Hypertension Father     Heart Disorder Father         bypass surg    Other (Other) Father         Hyperlipidemia    Diabetes Other         History of Diabetes    Hypertension Other         History of Hypertension    Glaucoma Neg         No glaucoma history      Social History     Socioeconomic History    Marital status: Single   Tobacco Use    Smoking status: Never    Smokeless tobacco: Never   Vaping Use    Vaping status: Never Used   Substance and Sexual Activity    Alcohol use: Yes     Comment: Socially    Drug use: Yes     Types: Cannabis    Sexual activity: Yes     Partners: Male   Other Topics Concern    Caffeine Concern Yes     Comment: Coffee, 3 cups daily    Reaction to local anesthetic No           REVIEW OF SYSTEMS:   Today reviewed systens as documented below  GENERAL HEALTH: feels well otherwise  SKIN: Refer to exam below  RESPIRATORY: denies shortness of breath with exertion  CARDIOVASCULAR: denies chest pain on exertion  GI: denies abdominal pain and denies heartburn  NEURO: denies headaches    EXAM:   There were no vitals taken for this visit.  GENERAL: well developed, well nourished, in no apparent distress  EXTREMITIES:   1. Integument: The skin on her left foot was evaluated is warm and dry the tinea is resolving nicely she has hyperkeratosis with a wart was removed which was trimmed there is no sign of recurrence.   2. Vascular: Patient has palpable pulses on the left   3. Neurologic: Intact sensorium there is no deficits no   4. Musculoskeletal: Has good muscle strength and is ambulatory.  She is still complaining of pain over her ankle predominantly ATFL area.  Palpation produces discomfort I could not do a positive drawer test.    ASSESSMENT AND PLAN:   Diagnoses and all orders for this visit:    Sprain of anterior talofibular ligament of left ankle, subsequent  encounter  -     PHYSICAL THERAPY - INTERNAL    Post-operative state        Plan: Physical therapy was prescribed for her ankle I will follow-up with her in a month.  If she has any questions or problems she can call.    The patient indicates understanding of these issues and agrees to the plan.    Barak Fajardo DPM

## 2025-06-20 ENCOUNTER — APPOINTMENT (OUTPATIENT)
Dept: URBAN - METROPOLITAN AREA CLINIC 246 | Age: 30
Setting detail: DERMATOLOGY
End: 2025-06-20

## 2025-06-20 DIAGNOSIS — R21 RASH AND OTHER NONSPECIFIC SKIN ERUPTION: ICD-10-CM

## 2025-06-20 DIAGNOSIS — L70.0 ACNE VULGARIS: ICD-10-CM

## 2025-06-20 PROCEDURE — OTHER COUNSELING: OTHER

## 2025-06-20 PROCEDURE — OTHER ADDITIONAL NOTES: OTHER

## 2025-06-20 PROCEDURE — OTHER MIPS QUALITY: OTHER

## 2025-06-20 PROCEDURE — OTHER PRESCRIPTION: OTHER

## 2025-06-20 PROCEDURE — OTHER TREATMENT REGIMEN: OTHER

## 2025-06-20 RX ORDER — TRETIONIN 0.25 MG/G
CREAM TOPICAL
Qty: 20 | Refills: 0 | Status: ERX | COMMUNITY
Start: 2025-06-20

## 2025-06-20 ASSESSMENT — LOCATION ZONE DERM
LOCATION ZONE: FACE
LOCATION ZONE: HAND

## 2025-06-20 ASSESSMENT — LOCATION DETAILED DESCRIPTION DERM
LOCATION DETAILED: LEFT RADIAL PALM
LOCATION DETAILED: LEFT INFERIOR CENTRAL MALAR CHEEK
LOCATION DETAILED: RIGHT INFERIOR CENTRAL MALAR CHEEK
LOCATION DETAILED: RIGHT RADIAL PALM

## 2025-06-20 ASSESSMENT — LOCATION SIMPLE DESCRIPTION DERM
LOCATION SIMPLE: RIGHT HAND
LOCATION SIMPLE: RIGHT CHEEK
LOCATION SIMPLE: LEFT HAND
LOCATION SIMPLE: LEFT CHEEK

## (undated) NOTE — LETTER
[address]   Date:   7/29/2020     Name:   Ai Smith    YOB: 1995   MRN:   [id number]       WHERE IS YOUR PAIN NOW? Filipe the areas on your body where you feel the described sensations.   Use the appropriate symbo

## (undated) NOTE — LETTER
4/13/2021              Yany Luther        [address]         Dear Arnaud Arellano,    This letter is to inform you that our office has made several attempts to reach you by phone without success.   We were attempting to contact you

## (undated) NOTE — LETTER
10/31/2017              [facility] [place]        [address] [address] [address]         Dear [facility],      It was a [facility] to [facility] at [facility].   Your lab tests were normal.  There is no need for f

## (undated) NOTE — LETTER
AUTHORIZATION FOR SURGICAL OPERATION OR OTHER PROCEDURE    1. I hereby authorize Dr. Barak Fajardo , and [facility] staff assigned to my case to perform the following operation and/or procedure at the [facility] site:    _______________________________________________________________________________________________    Cortisone injection left ankle   _______________________________________________________________________________________________    2.  My physician has explained the nature and purpose of the operation or other procedure, possible alternative methods of treatment, the risks involved, and the possibility of complication to me.  I acknowledge that no guarantee has been made as to the result that may be obtained.  3.  I recognize that, during the course of this operation, or other procedure, unforseen conditions may necessitate additional or different procedure than those listed above.  I, therefore, further authorize and request that the above named physician, his/her physician assistants or designees perform such procedures as are, in his/her professional opinion, necessary and desirable.  4.  Any tissue or organs removed in the operation or other procedure may be disposed of by and at the discretion of the [facility] and [facility].  5.  I understand that in the event of a medical emergency, I will be transported by local paramedics to [facility] or other hospital emergency department.  6.  I certify that I have read and fully understand the above consent to operation and/or other procedure.    7.  I acknowledge that my physician has explained sedation/analgesia administration to me including the risks and benefits.  I consent to the administration of sedation/analgesia as may be necessary or desirable in the judgement of my physician.    Witness signature: ___________________________________________________ Date:   ______/______/_____                    Time:  ________ A.M.  P.M.       Patient Name:  ______________________________________________________  (please print)      Patient signature:  ___________________________________________________             Relationship to Patient:           []  Parent    Responsible person                          []  Spouse  In case of minor or                    [] Other  _____________   Incompetent name:  __________________________________________________                               (please print)      _____________      Responsible person  In case of minor or  Incompetent signature:  _______________________________________________    Statement of Physician  My signature below affirms that prior to the time of the procedure, I have explained to the patient and/or his/her guardian, the risks and benefits involved in the proposed treatment and any reasonable alternative to the proposed treatment.  I have also explained the risks and benefits involved in the refusal of the proposed treatment and have answered the patient's questions.                        Date:  ______/______/_______  Provider                      Signature:  __________________________________________________________       Time:  ___________ A.M    P.M.

## (undated) NOTE — MR AVS SNAPSHOT
After Visit Summary   8/4/2020    Tiffany Guerra    MRN: [id number]           Visit Information     Date & Time  8/4/2020  2:20 PM Provider  Steve Bautista MD [address] [place], [place] [address].  Phone  [phone]      Y 9/23/2020 5:00 PM Evaristo Gomez in [facility]    9/30/2020 4:45 PM Sera [facility]    9/30/2020 6:30 PM Evaristo Gomez in [facility]    10/7/2020 6:30 PM Catherine Nuno Treatment for mild illness or injury that does not require immediate attention.  Average cost  $70*   [facility]  Monday – Friday  8:00 am – 8:00 pm   Saturday – Sunday  8:00 am – 4:00 pm    WALK-IN CARE  Primary Care

## (undated) NOTE — LETTER
Patient Name: Michael Cano  YOB: 1995          MRN number:  [id number]  Date:  8/26/2020  Referring Physician:  Pauline Tatum         LOWER EXTREMITY EVALUATION:    Referring Physician: Dr. Sam Monte  Dx:   Patellofemoral pain syndrome of rig No acute meniscal or ligamentous injury.      1.3 cm ganglion cyst seen along the superior margin of the popliteal recess, likely incidental.     Current functional limitations include: running, prolonged walking >1mile, squatting, lunging    Yany describes OBJECTIVE:   Observation: mild glut atrophy B  Gait: L ankle lateral whip and hip ER  Palpation: moderate hypertrophy of B ITB, R>L, moderate paraspinal hypertrophy B  Sensation: intact to light touch     AROM:  Knee: WNL B all motions  Ankle:  WNL with ex glut med strengthening, ambulation with TrA activation, heel to toe and glut activation - handout declined     Charges: PT Eval Low Complexity, TherEx x1      Total Timed Treatment: 20 min     Total Treatment Time: 57 min      Based on clinical rationale a Date____________________     Certification From: 2/60/0338  To:11/24/2020

## (undated) NOTE — LETTER
AUTHORIZATION FOR SURGICAL OPERATION OR OTHER PROCEDURE    1.  I hereby authorize Dr. Carlito Jackson, and [facility] staff assigned to my case to perform the following operation and/or procedure at the [facility]:    ____________________________ Time:  ________ A. M.  P.M.        Patient Name:  ______________________________________________________  (please print)      Patient signature:  ___________________________________________________             Relationship to Patient:

## (undated) NOTE — LETTER
AUTHORIZATION FOR SURGICAL OPERATION OR OTHER PROCEDURE    1. I hereby authorize Dr. Barak Fajardo, and [facility] staff assigned to my case to perform the following operation and/or procedure at the [facility] site:    _______________________________________________________________________________________________    Wart removal left foot using Hyfrecator Machine  _______________________________________________________________________________________________    2.  My physician has explained the nature and purpose of the operation or other procedure, possible alternative methods of treatment, the risks involved, and the possibility of complication to me.  I acknowledge that no guarantee has been made as to the result that may be obtained.  3.  I recognize that, during the course of this operation, or other procedure, unforseen conditions may necessitate additional or different procedure than those listed above.  I, therefore, further authorize and request that the above named physician, his/her physician assistants or designees perform such procedures as are, in his/her professional opinion, necessary and desirable.  4.  Any tissue or organs removed in the operation or other procedure may be disposed of by and at the discretion of the [facility].  5.  I understand that in the event of a medical emergency, I will be transported by local paramedics to [facility] or other hospital emergency department.  6.  I certify that I have read and fully understand the above consent to operation and/or other procedure.    7.  I acknowledge that my physician has explained sedation/analgesia administration to me including the risks and benefits.  I consent to the administration of sedation/analgesia as may be necessary or desirable in the judgement of my physician.    Witness signature: ___________________________________________________ Date:   ______/______/_____                    Time:  ________ A.M.  P.M.       Patient Name:  ______________________________________________________  (please print)      Patient signature:  ___________________________________________________             Relationship to Patient:           []  Parent    Responsible person                          []  Spouse  In case of minor or                    [] Other  _____________   Incompetent name:  __________________________________________________                               (please print)      _____________      Responsible person  In case of minor or  Incompetent signature:  _______________________________________________    Statement of Physician  My signature below affirms that prior to the time of the procedure, I have explained to the patient and/or his/her guardian, the risks and benefits involved in the proposed treatment and any reasonable alternative to the proposed treatment.  I have also explained the risks and benefits involved in the refusal of the proposed treatment and have answered the patient's questions.                        Date:  ______/______/_______  Provider                      Signature:  __________________________________________________________       Time:  ___________ A.M    P.M.